# Patient Record
Sex: FEMALE | Race: BLACK OR AFRICAN AMERICAN | NOT HISPANIC OR LATINO | URBAN - METROPOLITAN AREA
[De-identification: names, ages, dates, MRNs, and addresses within clinical notes are randomized per-mention and may not be internally consistent; named-entity substitution may affect disease eponyms.]

---

## 2018-02-03 ENCOUNTER — EMERGENCY (EMERGENCY)
Facility: HOSPITAL | Age: 53
LOS: 0 days | Discharge: HOME | End: 2018-02-03
Attending: EMERGENCY MEDICINE
Payer: COMMERCIAL

## 2018-02-03 VITALS
DIASTOLIC BLOOD PRESSURE: 76 MMHG | HEIGHT: 63 IN | WEIGHT: 149.91 LBS | HEART RATE: 62 BPM | TEMPERATURE: 98 F | OXYGEN SATURATION: 100 % | RESPIRATION RATE: 18 BRPM | SYSTOLIC BLOOD PRESSURE: 133 MMHG
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
## 2018-02-03 DIAGNOSIS — S61.412A LACERATION WITHOUT FOREIGN BODY OF LEFT HAND, INITIAL ENCOUNTER: ICD-10-CM
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
## 2018-02-03 PROCEDURE — 99283 EMERGENCY DEPT VISIT LOW MDM: CPT | Mod: 25

## 2018-02-03 PROCEDURE — 12001 RPR S/N/AX/GEN/TRNK 2.5CM/<: CPT

## 2018-02-03 RX ORDER — TETANUS TOXOID, REDUCED DIPHTHERIA TOXOID AND ACELLULAR PERTUSSIS VACCINE, ADSORBED 5; 2.5; 8; 8; 2.5 [IU]/.5ML; [IU]/.5ML; UG/.5ML; UG/.5ML; UG/.5ML
0.5 SUSPENSION INTRAMUSCULAR ONCE
Qty: 0 | Refills: 0 | Status: COMPLETED | OUTPATIENT
Start: 2018-02-03 | End: 2018-02-03

## 2018-02-03 RX ADMIN — TETANUS TOXOID, REDUCED DIPHTHERIA TOXOID AND ACELLULAR PERTUSSIS VACCINE, ADSORBED 0.5 MILLILITER(S): 5; 2.5; 8; 8; 2.5 SUSPENSION INTRAMUSCULAR at 17:20

## 2018-02-03 NOTE — ED ADULT NURSE NOTE - OBJECTIVE STATEMENT
53 yo female co 2cm  lac to Left, palm, x today. cutting frozen vegetables. last tetanus vaccine unknown.

## 2018-02-14 ENCOUNTER — EMERGENCY (EMERGENCY)
Facility: HOSPITAL | Age: 53
LOS: 0 days | Discharge: HOME | End: 2018-02-14
Attending: EMERGENCY MEDICINE

## 2018-02-14 VITALS
OXYGEN SATURATION: 99 % | TEMPERATURE: 98 F | HEIGHT: 63 IN | HEART RATE: 73 BPM | WEIGHT: 149.91 LBS | SYSTOLIC BLOOD PRESSURE: 158 MMHG | RESPIRATION RATE: 18 BRPM | DIASTOLIC BLOOD PRESSURE: 83 MMHG

## 2018-02-14 DIAGNOSIS — Z48.02 ENCOUNTER FOR REMOVAL OF SUTURES: ICD-10-CM

## 2018-02-14 NOTE — ED PROVIDER NOTE - PROGRESS NOTE DETAILS
Pt. reports feeling better after meds and suture removal   pt. agrees to f/u with primary care outpt.  pt. understands to return to ED if symptoms worsen; will d/c

## 2018-02-14 NOTE — ED PROVIDER NOTE - OBJECTIVE STATEMENT
51 yo F presents for suture removal.  3 stitches were placed on Feb 3rd.  Pt. had laceration to L palm, 2 cm in length.  Pt. has no complaints, presents only for suture removal.  ROS: negative for fever, cough, headache, chest pain, shortness of breath, abd pain, nausea, vomiting, diarrhea, rash, paresthesia, and weakness.   PMH: negative; Meds: Denies; SH: Denies smoking/drinking/drug use

## 2018-02-14 NOTE — ED PROVIDER NOTE - PHYSICAL EXAMINATION
Vitals:   Gen: AAOx3, NAD, sitting comfortably in stretcher  Head: ncat, perrla, eomi b/l  Neck: supple, no lymphadenopathy, no midline deviation  Heart: rrr, no m/r/g  Lungs: CTA b/l, no rales/ronchi/wheezes  Abd: soft, nontender, non-distended, no rebound or guarding  Ext: no clubbing/cyanosis/edema, well-healed laceration to L palm 2 cm in length, 3 stiches in place, no signs of infection, no trailing erythema or discharge  Neuro: sensation and muscle strength intact b/l, steady gait

## 2020-07-20 NOTE — ED ADULT NURSE NOTE - NSSISCREENINGQ3_ED_A_ED
What Type Of Note Output Would You Prefer (Optional)?: Standard Output How Severe Is Your Skin Lesion?: moderate Has Your Skin Lesion Been Treated?: not been treated Is This A New Presentation, Or A Follow-Up?: Skin Lesions No

## 2022-07-12 ENCOUNTER — APPOINTMENT (OUTPATIENT)
Dept: GASTROENTEROLOGY | Facility: CLINIC | Age: 57
End: 2022-07-12

## 2022-07-12 ENCOUNTER — NON-APPOINTMENT (OUTPATIENT)
Age: 57
End: 2022-07-12

## 2022-07-12 ENCOUNTER — APPOINTMENT (OUTPATIENT)
Dept: THORACIC SURGERY | Facility: CLINIC | Age: 57
End: 2022-07-12

## 2022-07-12 VITALS
DIASTOLIC BLOOD PRESSURE: 67 MMHG | OXYGEN SATURATION: 98 % | SYSTOLIC BLOOD PRESSURE: 124 MMHG | BODY MASS INDEX: 25.69 KG/M2 | HEART RATE: 60 BPM | HEIGHT: 63 IN | WEIGHT: 145 LBS | RESPIRATION RATE: 16 BRPM

## 2022-07-12 DIAGNOSIS — Z86.39 PERSONAL HISTORY OF OTHER ENDOCRINE, NUTRITIONAL AND METABOLIC DISEASE: ICD-10-CM

## 2022-07-12 DIAGNOSIS — K44.9 DIAPHRAGMATIC HERNIA W/OUT OBSTRUCTION OR GANGRENE: ICD-10-CM

## 2022-07-12 DIAGNOSIS — Z01.818 ENCOUNTER FOR OTHER PREPROCEDURAL EXAMINATION: ICD-10-CM

## 2022-07-12 PROCEDURE — 99205 OFFICE O/P NEW HI 60 MIN: CPT

## 2022-07-12 PROCEDURE — 99441: CPT

## 2022-07-12 RX ORDER — OMEPRAZOLE 40 MG/1
40 CAPSULE, DELAYED RELEASE ORAL
Qty: 90 | Refills: 0 | Status: ACTIVE | COMMUNITY
Start: 2022-07-08

## 2022-07-12 NOTE — PHYSICAL EXAM
[Fully active, able to carry on all pre-disease performance without restriction] : Status 0 - Fully active, able to carry on all pre-disease performance without restriction [General Appearance - Alert] : alert [General Appearance - In No Acute Distress] : in no acute distress [Sclera] : the sclera and conjunctiva were normal [PERRL With Normal Accommodation] : pupils were equal in size, round, and reactive to light [Extraocular Movements] : extraocular movements were intact [Outer Ear] : the ears and nose were normal in appearance [Oropharynx] : the oropharynx was normal [Neck Appearance] : the appearance of the neck was normal [Neck Cervical Mass (___cm)] : no neck mass was observed [Jugular Venous Distention Increased] : there was no jugular-venous distention [Thyroid Diffuse Enlargement] : the thyroid was not enlarged [Thyroid Nodule] : there were no palpable thyroid nodules [Auscultation Breath Sounds / Voice Sounds] : lungs were clear to auscultation bilaterally [Heart Rate And Rhythm] : heart rate was normal and rhythm regular [Heart Sounds] : normal S1 and S2 [Heart Sounds Gallop] : no gallops [Murmurs] : no murmurs [Heart Sounds Pericardial Friction Rub] : no pericardial rub [Examination Of The Chest] : the chest was normal in appearance [Chest Visual Inspection Thoracic Asymmetry] : no chest asymmetry [Diminished Respiratory Excursion] : normal chest expansion [2+] : left 2+ [Breast Appearance] : normal in appearance [Breast Palpation Mass] : no palpable masses [Bowel Sounds] : normal bowel sounds [Abdomen Soft] : soft [Abdomen Tenderness] : non-tender [Abdomen Mass (___ Cm)] : no abdominal mass palpated [Cervical Lymph Nodes Enlarged Posterior Bilaterally] : posterior cervical [Cervical Lymph Nodes Enlarged Anterior Bilaterally] : anterior cervical [Supraclavicular Lymph Nodes Enlarged Bilaterally] : supraclavicular [No CVA Tenderness] : no ~M costovertebral angle tenderness [No Spinal Tenderness] : no spinal tenderness [Abnormal Walk] : normal gait [Nail Clubbing] : no clubbing  or cyanosis of the fingernails [Musculoskeletal - Swelling] : no joint swelling seen [Motor Tone] : muscle strength and tone were normal [Skin Color & Pigmentation] : normal skin color and pigmentation [Skin Turgor] : normal skin turgor [] : no rash [Deep Tendon Reflexes (DTR)] : deep tendon reflexes were 2+ and symmetric [Sensation] : the sensory exam was normal to light touch and pinprick [No Focal Deficits] : no focal deficits [Oriented To Time, Place, And Person] : oriented to person, place, and time [Impaired Insight] : insight and judgment were intact [Affect] : the affect was normal [FreeTextEntry1] : deferred

## 2022-07-12 NOTE — CONSULT LETTER
[Dear  ___] : Dear  [unfilled], [Consult Letter:] : I had the pleasure of evaluating your patient, [unfilled]. [( Thank you for referring [unfilled] for consultation for _____ )] : Thank you for referring [unfilled] for consultation for [unfilled] [Please see my note below.] : Please see my note below. [Consult Closing:] : Thank you very much for allowing me to participate in the care of this patient.  If you have any questions, please do not hesitate to contact me. [Sincerely,] : Sincerely, [FreeTextEntry2] : Manpreet Jeffrey MD (ref/GI) [FreeTextEntry3] : Saul Galvan MD \par Department of Cardiovascular and Thoracic Surgery \par  \par [facility] \par \par \par

## 2022-07-12 NOTE — HISTORY OF PRESENT ILLNESS
[FreeTextEntry1] : Ms. YEMI ALONSO, 57 year old female, never smoker, w/ hx of GERD, who presented with worsening acid reflux, epigastric pain/discomfort, food regurgitation since May this yr. \par \par EGD on 7/7/2017 showed chronic superficial gastritis w/o bleeding, polyp of stomach and duodenum. \par \par Pt presents today for CT Sx consultation, referred by Dr. Jeffrey. Pt admits to heartburn, epigastric pain/discomfort and food regurgitation.

## 2022-07-12 NOTE — ASSESSMENT
[FreeTextEntry1] : Ms. YEMI ALONSO, 57 year old female, never smoker, w/ hx of GERD, who presented with worsening acid reflux, epigastric pain/discomfort, food regurgitation since May this yr. \par \par EGD on 7/7/2017 showed chronic superficial gastritis w/o bleeding, polyp of stomach and duodenum. \par \par I have reviewed the patient's medical records and diagnostic images at time of this office consultation and have made the following recommendation:\par 1. Pt is very symptomatic, I recommended upper endoscopy, R.A. Hiatal hernia repair. Risks and benefits and alternatives explained to patient, all questions answered, patient agreed to proceed with surgery.\par 2. CT Chest w/o contrast, manometry by Dr. Shook, RUST to review all these studies prior to surgery\par 3. Medical clearance, PST, COVID testing\par \par \par I personally performed the services described in the documentation, reviewed the documentation recorded by the scribe in my presence and it accurately and completely records my words and actions. \par \par I, Liz Mtz, NP, am scribing for and the presence of AISLINN Schultz, the following sections HISTORY OF PRESENT ILLNESS, PAST MEDICAL/FAMILY/SOCIAL HISTORY; REVIEW OF SYSTEMS; VITAL SIGNS; PHYSICAL EXAM; DISPOSITION.\par

## 2022-07-13 PROBLEM — K44.9 DIAPHRAGMATIC HERNIA: Status: ACTIVE | Noted: 2022-07-12

## 2022-07-29 ENCOUNTER — OUTPATIENT (OUTPATIENT)
Dept: OUTPATIENT SERVICES | Facility: HOSPITAL | Age: 57
LOS: 1 days | Discharge: HOME | End: 2022-07-29
Payer: COMMERCIAL

## 2022-07-29 ENCOUNTER — APPOINTMENT (OUTPATIENT)
Dept: GASTROENTEROLOGY | Facility: HOSPITAL | Age: 57
End: 2022-07-29

## 2022-07-29 VITALS
SYSTOLIC BLOOD PRESSURE: 123 MMHG | HEIGHT: 63 IN | DIASTOLIC BLOOD PRESSURE: 68 MMHG | HEART RATE: 100 BPM | TEMPERATURE: 96 F | OXYGEN SATURATION: 100 % | WEIGHT: 145.06 LBS | RESPIRATION RATE: 16 BRPM

## 2022-07-29 DIAGNOSIS — K44.9 DIAPHRAGMATIC HERNIA WITHOUT OBSTRUCTION OR GANGRENE: ICD-10-CM

## 2022-07-29 PROCEDURE — 91037 ESOPH IMPED FUNCTION TEST: CPT | Mod: 26

## 2022-07-29 PROCEDURE — 91010 ESOPHAGUS MOTILITY STUDY: CPT | Mod: 26

## 2022-08-02 ENCOUNTER — NON-APPOINTMENT (OUTPATIENT)
Age: 57
End: 2022-08-02

## 2022-08-16 ENCOUNTER — APPOINTMENT (OUTPATIENT)
Dept: THORACIC SURGERY | Facility: HOSPITAL | Age: 57
End: 2022-08-16

## 2022-08-22 NOTE — ASSESSMENT
[FreeTextEntry1] : \par \par \par I have reviewed the patient's medical records and diagnostic images at time of this office consultation and have made the following recommendation:\par 1.\par \par \par \par I personally performed the services described in the documentation, reviewed the documentation recorded by the scribe in my presence and it accurately and completely records my words and actions. \par \par I, Liz Mtz NP, am scribing for and the presence of AISLINN Schultz, the following sections HISTORY OF PRESENT ILLNESS, PAST MEDICAL/FAMILY/SOCIAL HISTORY; REVIEW OF SYSTEMS; VITAL SIGNS; PHYSICAL EXAM; DISPOSITION.\par

## 2022-08-22 NOTE — CONSULT LETTER
[Dear  ___] : Dear  [unfilled], [Consult Letter:] : I had the pleasure of evaluating your patient, [unfilled]. [( Thank you for referring [unfilled] for consultation for _____ )] : Thank you for referring [unfilled] for consultation for [unfilled] [Please see my note below.] : Please see my note below. [Consult Closing:] : Thank you very much for allowing me to participate in the care of this patient.  If you have any questions, please do not hesitate to contact me. [Sincerely,] : Sincerely, [FreeTextEntry2] : Manpreet Jeffrey MD (ref/GI) [FreeTextEntry3] : Saul Galvan MD \par Department of Cardiovascular and Thoracic Surgery \par  \par [facility] \par \par \par

## 2022-08-22 NOTE — HISTORY OF PRESENT ILLNESS
[FreeTextEntry1] : Ms. YEMI ALONSO, 57 year old female, never smoker, w/ hx of GERD, who presented with worsening acid reflux, epigastric pain/discomfort, food regurgitation since May this yr. \par \par EGD on 7/7/2017 showed chronic superficial gastritis w/o bleeding, polyp of stomach and duodenum. \par \par CT Chest on 7/21/22:\par - 7 mm hypodense nodule Lt thyroid lobe\par - mild subsegmental atelectasis in the lingula segment of KAMILAH\par - no hiatal hernia\par \par Manometry on 7/29/22: normal. No manometric evidence of Hiatal hernia\par \par Pt presents today for follow up.\par

## 2022-08-23 ENCOUNTER — APPOINTMENT (OUTPATIENT)
Dept: THORACIC SURGERY | Facility: CLINIC | Age: 57
End: 2022-08-23

## 2022-09-29 ENCOUNTER — INPATIENT (INPATIENT)
Facility: HOSPITAL | Age: 57
LOS: 2 days | Discharge: HOME | End: 2022-10-02
Attending: SPECIALIST | Admitting: SPECIALIST

## 2022-09-29 VITALS
HEIGHT: 63 IN | OXYGEN SATURATION: 100 % | TEMPERATURE: 99 F | HEART RATE: 87 BPM | SYSTOLIC BLOOD PRESSURE: 123 MMHG | DIASTOLIC BLOOD PRESSURE: 57 MMHG | RESPIRATION RATE: 15 BRPM

## 2022-09-29 DIAGNOSIS — R10.9 UNSPECIFIED ABDOMINAL PAIN: ICD-10-CM

## 2022-09-29 LAB
ALBUMIN SERPL ELPH-MCNC: 3.4 G/DL — SIGNIFICANT CHANGE UP (ref 3.3–5)
ALP SERPL-CCNC: 271 U/L — HIGH (ref 40–120)
ALT FLD-CCNC: 114 U/L — HIGH (ref 4–33)
ANION GAP SERPL CALC-SCNC: 9 MMOL/L — SIGNIFICANT CHANGE UP (ref 7–14)
APTT BLD: 30.4 SEC — SIGNIFICANT CHANGE UP (ref 27–36.3)
AST SERPL-CCNC: 80 U/L — HIGH (ref 4–32)
BASOPHILS # BLD AUTO: 0.02 K/UL — SIGNIFICANT CHANGE UP (ref 0–0.2)
BASOPHILS NFR BLD AUTO: 0.1 % — SIGNIFICANT CHANGE UP (ref 0–2)
BILIRUB SERPL-MCNC: 0.7 MG/DL — SIGNIFICANT CHANGE UP (ref 0.2–1.2)
BLD GP AB SCN SERPL QL: NEGATIVE — SIGNIFICANT CHANGE UP
BUN SERPL-MCNC: 5 MG/DL — LOW (ref 7–23)
CALCIUM SERPL-MCNC: 8.7 MG/DL — SIGNIFICANT CHANGE UP (ref 8.4–10.5)
CHLORIDE SERPL-SCNC: 96 MMOL/L — LOW (ref 98–107)
CO2 SERPL-SCNC: 27 MMOL/L — SIGNIFICANT CHANGE UP (ref 22–31)
CREAT SERPL-MCNC: 0.82 MG/DL — SIGNIFICANT CHANGE UP (ref 0.5–1.3)
EGFR: 84 ML/MIN/1.73M2 — SIGNIFICANT CHANGE UP
EOSINOPHIL # BLD AUTO: 0.02 K/UL — SIGNIFICANT CHANGE UP (ref 0–0.5)
EOSINOPHIL NFR BLD AUTO: 0.1 % — SIGNIFICANT CHANGE UP (ref 0–6)
FLUAV AG NPH QL: SIGNIFICANT CHANGE UP
FLUBV AG NPH QL: SIGNIFICANT CHANGE UP
GLUCOSE SERPL-MCNC: 98 MG/DL — SIGNIFICANT CHANGE UP (ref 70–99)
HCT VFR BLD CALC: 36.5 % — SIGNIFICANT CHANGE UP (ref 34.5–45)
HGB BLD-MCNC: 12 G/DL — SIGNIFICANT CHANGE UP (ref 11.5–15.5)
IANC: 10.56 K/UL — HIGH (ref 1.8–7.4)
IMM GRANULOCYTES NFR BLD AUTO: 1.1 % — HIGH (ref 0–0.9)
INR BLD: 1.16 RATIO — SIGNIFICANT CHANGE UP (ref 0.88–1.16)
LIDOCAIN IGE QN: 72 U/L — HIGH (ref 7–60)
LYMPHOCYTES # BLD AUTO: 14.1 % — SIGNIFICANT CHANGE UP (ref 13–44)
LYMPHOCYTES # BLD AUTO: 2 K/UL — SIGNIFICANT CHANGE UP (ref 1–3.3)
MCHC RBC-ENTMCNC: 28.6 PG — SIGNIFICANT CHANGE UP (ref 27–34)
MCHC RBC-ENTMCNC: 32.9 GM/DL — SIGNIFICANT CHANGE UP (ref 32–36)
MCV RBC AUTO: 86.9 FL — SIGNIFICANT CHANGE UP (ref 80–100)
MONOCYTES # BLD AUTO: 1.47 K/UL — HIGH (ref 0–0.9)
MONOCYTES NFR BLD AUTO: 10.3 % — SIGNIFICANT CHANGE UP (ref 2–14)
NEUTROPHILS # BLD AUTO: 10.56 K/UL — HIGH (ref 1.8–7.4)
NEUTROPHILS NFR BLD AUTO: 74.3 % — SIGNIFICANT CHANGE UP (ref 43–77)
NRBC # BLD: 0 /100 WBCS — SIGNIFICANT CHANGE UP (ref 0–0)
NRBC # FLD: 0 K/UL — SIGNIFICANT CHANGE UP (ref 0–0)
PLATELET # BLD AUTO: 305 K/UL — SIGNIFICANT CHANGE UP (ref 150–400)
POTASSIUM SERPL-MCNC: 3 MMOL/L — LOW (ref 3.5–5.3)
POTASSIUM SERPL-SCNC: 3 MMOL/L — LOW (ref 3.5–5.3)
PROT SERPL-MCNC: 7.5 G/DL — SIGNIFICANT CHANGE UP (ref 6–8.3)
PROTHROM AB SERPL-ACNC: 13.5 SEC — HIGH (ref 10.5–13.4)
RBC # BLD: 4.2 M/UL — SIGNIFICANT CHANGE UP (ref 3.8–5.2)
RBC # FLD: 13.2 % — SIGNIFICANT CHANGE UP (ref 10.3–14.5)
RH IG SCN BLD-IMP: POSITIVE — SIGNIFICANT CHANGE UP
RSV RNA NPH QL NAA+NON-PROBE: SIGNIFICANT CHANGE UP
SARS-COV-2 RNA SPEC QL NAA+PROBE: SIGNIFICANT CHANGE UP
SODIUM SERPL-SCNC: 132 MMOL/L — LOW (ref 135–145)
WBC # BLD: 14.23 K/UL — HIGH (ref 3.8–10.5)
WBC # FLD AUTO: 14.23 K/UL — HIGH (ref 3.8–10.5)

## 2022-09-29 PROCEDURE — 76705 ECHO EXAM OF ABDOMEN: CPT | Mod: 26

## 2022-09-29 PROCEDURE — 99285 EMERGENCY DEPT VISIT HI MDM: CPT

## 2022-09-29 RX ORDER — METRONIDAZOLE 500 MG
500 TABLET ORAL EVERY 8 HOURS
Refills: 0 | Status: DISCONTINUED | OUTPATIENT
Start: 2022-09-30 | End: 2022-10-02

## 2022-09-29 RX ORDER — CIPROFLOXACIN LACTATE 400MG/40ML
VIAL (ML) INTRAVENOUS
Refills: 0 | Status: DISCONTINUED | OUTPATIENT
Start: 2022-09-29 | End: 2022-10-02

## 2022-09-29 RX ORDER — ENOXAPARIN SODIUM 100 MG/ML
40 INJECTION SUBCUTANEOUS EVERY 24 HOURS
Refills: 0 | Status: DISCONTINUED | OUTPATIENT
Start: 2022-09-29 | End: 2022-10-02

## 2022-09-29 RX ORDER — DEXTROSE MONOHYDRATE, SODIUM CHLORIDE, AND POTASSIUM CHLORIDE 50; .745; 4.5 G/1000ML; G/1000ML; G/1000ML
1000 INJECTION, SOLUTION INTRAVENOUS
Refills: 0 | Status: DISCONTINUED | OUTPATIENT
Start: 2022-09-29 | End: 2022-10-01

## 2022-09-29 RX ORDER — IBUPROFEN 200 MG
400 TABLET ORAL EVERY 6 HOURS
Refills: 0 | Status: DISCONTINUED | OUTPATIENT
Start: 2022-09-29 | End: 2022-10-02

## 2022-09-29 RX ORDER — FAMOTIDINE 10 MG/ML
20 INJECTION INTRAVENOUS ONCE
Refills: 0 | Status: COMPLETED | OUTPATIENT
Start: 2022-09-29 | End: 2022-09-29

## 2022-09-29 RX ORDER — METRONIDAZOLE 500 MG
TABLET ORAL
Refills: 0 | Status: DISCONTINUED | OUTPATIENT
Start: 2022-10-02 | End: 2022-10-02

## 2022-09-29 RX ORDER — ACETAMINOPHEN 500 MG
650 TABLET ORAL EVERY 6 HOURS
Refills: 0 | Status: DISCONTINUED | OUTPATIENT
Start: 2022-09-29 | End: 2022-10-02

## 2022-09-29 RX ORDER — CIPROFLOXACIN LACTATE 400MG/40ML
400 VIAL (ML) INTRAVENOUS ONCE
Refills: 0 | Status: COMPLETED | OUTPATIENT
Start: 2022-09-29 | End: 2022-09-29

## 2022-09-29 RX ORDER — OXYCODONE HYDROCHLORIDE 5 MG/1
5 TABLET ORAL EVERY 6 HOURS
Refills: 0 | Status: DISCONTINUED | OUTPATIENT
Start: 2022-09-29 | End: 2022-10-02

## 2022-09-29 RX ORDER — SODIUM CHLORIDE 9 MG/ML
1000 INJECTION INTRAMUSCULAR; INTRAVENOUS; SUBCUTANEOUS ONCE
Refills: 0 | Status: COMPLETED | OUTPATIENT
Start: 2022-09-29 | End: 2022-09-29

## 2022-09-29 RX ORDER — METRONIDAZOLE 500 MG
500 TABLET ORAL ONCE
Refills: 0 | Status: COMPLETED | OUTPATIENT
Start: 2022-09-29 | End: 2022-09-29

## 2022-09-29 RX ORDER — CIPROFLOXACIN LACTATE 400MG/40ML
400 VIAL (ML) INTRAVENOUS EVERY 12 HOURS
Refills: 0 | Status: DISCONTINUED | OUTPATIENT
Start: 2022-09-30 | End: 2022-10-02

## 2022-09-29 RX ORDER — PANTOPRAZOLE SODIUM 20 MG/1
40 TABLET, DELAYED RELEASE ORAL EVERY 24 HOURS
Refills: 0 | Status: DISCONTINUED | OUTPATIENT
Start: 2022-09-29 | End: 2022-10-02

## 2022-09-29 RX ORDER — OXYCODONE HYDROCHLORIDE 5 MG/1
2.5 TABLET ORAL EVERY 6 HOURS
Refills: 0 | Status: DISCONTINUED | OUTPATIENT
Start: 2022-09-29 | End: 2022-10-02

## 2022-09-29 RX ADMIN — FAMOTIDINE 20 MILLIGRAM(S): 10 INJECTION INTRAVENOUS at 20:11

## 2022-09-29 RX ADMIN — Medication 200 MILLIGRAM(S): at 22:09

## 2022-09-29 RX ADMIN — SODIUM CHLORIDE 1000 MILLILITER(S): 9 INJECTION INTRAMUSCULAR; INTRAVENOUS; SUBCUTANEOUS at 20:11

## 2022-09-29 NOTE — ED PROVIDER NOTE - OBJECTIVE STATEMENT
56 y/o F h/o cholelithiasis sent to ED by Dr Martínez with concern for gallbladder infection. Pt was seen in office by Dr. Martínez, had blood work drawn, results were concerning for gallbladder infection per patient. Pt was advised to come to ED. Pt reports epigastric pain x 1 week a/w n/v. Pt reports last episode of emesis on Friday, has not been eating much since. States when she goes to eat she gets nauseous. Pt denies fever, chills, chest pain, sob, cough, diarrhea, dysuria, headache, dizziness.

## 2022-09-29 NOTE — H&P ADULT - NSHPPHYSICALEXAM_GEN_ALL_CORE
Vital Signs Last 24 Hrs  T(C): 37.1 (29 Sep 2022 17:54), Max: 37.1 (29 Sep 2022 17:54)  T(F): 98.8 (29 Sep 2022 17:54), Max: 98.8 (29 Sep 2022 17:54)  HR: 87 (29 Sep 2022 17:54) (87 - 87)  BP: 123/57 (29 Sep 2022 17:54) (123/57 - 123/57)  BP(mean): --  RR: 15 (29 Sep 2022 17:54) (15 - 15)  SpO2: 100% (29 Sep 2022 17:54) (100% - 100%)    Parameters below as of 29 Sep 2022 17:54  Patient On (Oxygen Delivery Method): room air    General: well developed, well nourished, NAD  Neuro: alert and oriented, no focal deficits, moves all extremities spontaneously  HEENT: NCAT, EOMI, anicteric, mucosa moist  Respiratory: airway patent, respirations unlabored  CVS: regular rate  Abd: Soft, tender in epigastrium and RUQ, nondistended  Extremities: no edema, sensation and movement grossly intact  Skin: warm, dry, appropriate color

## 2022-09-29 NOTE — ED PROVIDER NOTE - ATTENDING APP SHARED VISIT CONTRIBUTION OF CARE
Satish: I have seen and examined the patient face to face, have reviewed and addended the HPI, PE and a/p as necessary.    58 yo F with cholelithiasis a/w elevated LFTs with concern for acute cholecystitis.  Pt seen by Dr. Martínez yesterday with bloodwork drawn yesterday called by Dr. Martínez today to come to ED for possible gallbladder infection.  Pt reports persistent abdominal pain x 1 week with associated nausea and vomiting and decreased PO intake.  Denies fever, chills, chest pain, sob, cough, diarrhea, dysuria, headache, dizziness.    GEN - NAD; well appearing; A+O x3; non-toxic appearing  CARD -s1s2, RRR, no M,G,R;   PULM - CTA b/l, symmetric breath sounds;   ABD -  +BS, TTP in RUQ +cervantes's, soft, no guarding, no rebound, no masses;   BACK - no CVA tenderness, Normal  spine;   EXT - symmetric pulses, 2+ dp, capillary refill < 2 seconds, no cyanosis, no edema;   NEURO - no focal neuro deficits, no slurred speech    Concern for acute kameron vs choledocholithiasis, vs symptomatic cholelithiasis.  will eval gb with us, check cbc cmp, lipase, and surgery eval.  Surgery present at bedside during my evaluation.

## 2022-09-29 NOTE — ED ADULT NURSE NOTE - CHIEF COMPLAINT QUOTE
Pt c/o abdominal pain  X 1 week. Pt had call back from  today, CT scan showing gallstones. Phx: GERD

## 2022-09-29 NOTE — H&P ADULT - NSHPROSALLOTHERNEGRD_GEN_ALL_CORE
Detail Level: Detailed
Add 85663 Cpt? (Important Note: In 2017 The Use Of 52706 Is Being Tracked By Cms To Determine Future Global Period Reimbursement For Global Periods): no
All other review of systems negative, except as noted in HPI

## 2022-09-29 NOTE — H&P ADULT - ASSESSMENT
57F pmh biliary colic with cholelithiasis, hiatal hernia, GERD presenting with abdominal pain since 9/23.    Plan:  - Admit tot Dr. Martínez  - NPO, IVF, Cipro/Flagyl  - FU US  - MRCP, GI consult pending MRCP  - Lovenox  - Protonix    Discussed with Dr. Mauricio GALINDO Team Surgery  [id number]

## 2022-09-29 NOTE — H&P ADULT - HISTORY OF PRESENT ILLNESS
57F pmh biliary colic with cholelithiasis, hiatal hernia, GERD presenting with abdominal pain since 9/23.    Reports "hot" abdominal pain up to breast since 9/22, 10/10 at first now 4/10, constant, worsened with eating. Had multiple emesis on 9/22-9/24, yellow in color then with some black fragments per family member. Recent history of darker urine. No fevers, night sweats, some chills. No chest pain, shortness of breath. Had one bowel movement this week, constipated, normal in color.

## 2022-09-29 NOTE — ED ADULT NURSE NOTE - NSIMPLEMENTINTERV_GEN_ALL_ED
Implemented All Universal Safety Interventions:  [place] to call system. Call bell, personal items and telephone within reach. Instruct patient to call for assistance. Room bathroom lighting operational. Non-slip footwear when patient is off stretcher. Physically safe environment: no spills, clutter or unnecessary equipment. Stretcher in lowest position, wheels locked, appropriate side rails in place.

## 2022-09-29 NOTE — ED ADULT NURSE NOTE - OBJECTIVE STATEMENT
Received pt to intake 10a, A+Ox4, ambulatory. C/O lower abdominal pain, seen outpatient diagnosed with gallbladder stone. endorsing nausea. ABD is soft, tender, non distended. Respirations even and unlabored, normal work of breathing, no accessory muscle use, speaking in full clear uninterrupted sentences. Pt denies any chest pain, SOB, headache, dizziness, fever, chills.  20G to RAC, Labs sent, Medicated as per MD, will continue to monitor.

## 2022-09-29 NOTE — H&P ADULT - NSICDXPASTMEDICALHX_GEN_ALL_CORE_FT
PAST MEDICAL HISTORY:  Hiatal hernia     Hiatal hernia with GERD     No pertinent past medical history

## 2022-09-29 NOTE — ED PROVIDER NOTE - CLINICAL SUMMARY MEDICAL DECISION MAKING FREE TEXT BOX
58 y/o F h/o cholelithiasis sent to ED by Dr Martínez with concern for gallbladder infection.  plan  - labs  - t&s, coags  - ruq us  - ivf   - pain control  - surgery consult

## 2022-09-29 NOTE — H&P ADULT - NSHPLABSRESULTS_GEN_ALL_CORE
----------  LABORATORY DATA:  ----------                        12.0   14.23 )-----------( 305      ( 29 Sep 2022 20:10 )             36.5               09-29    x   |  x   |  5<L>  ----------------------------<  98  x    |  27  |  0.82    Ca    8.7      29 Sep 2022 20:10    TPro  7.5  /  Alb  x   /  TBili  x   /  DBili  x   /  AST  x   /  ALT  x   /  AlkPhos  x   09-29    LIVER FUNCTIONS - ( 29 Sep 2022 20:10 )  Alb: x     / Pro: 7.5 g/dL / ALK PHOS: x     / ALT: x     / AST: x     / GGT: x

## 2022-09-29 NOTE — ED PROVIDER NOTE - NS ED ATTENDING STATEMENT MOD
This was a shared visit with the CARMELA. I reviewed and verified the documentation and independently performed the documented:

## 2022-09-30 LAB
ALBUMIN SERPL ELPH-MCNC: 3.4 G/DL — SIGNIFICANT CHANGE UP (ref 3.3–5)
ALP SERPL-CCNC: 256 U/L — HIGH (ref 40–120)
ALT FLD-CCNC: 101 U/L — HIGH (ref 4–33)
ANION GAP SERPL CALC-SCNC: 11 MMOL/L — SIGNIFICANT CHANGE UP (ref 7–14)
AST SERPL-CCNC: 56 U/L — HIGH (ref 4–32)
BILIRUB SERPL-MCNC: 0.6 MG/DL — SIGNIFICANT CHANGE UP (ref 0.2–1.2)
BLD GP AB SCN SERPL QL: NEGATIVE — SIGNIFICANT CHANGE UP
BUN SERPL-MCNC: 6 MG/DL — LOW (ref 7–23)
CALCIUM SERPL-MCNC: 8.8 MG/DL — SIGNIFICANT CHANGE UP (ref 8.4–10.5)
CHLORIDE SERPL-SCNC: 101 MMOL/L — SIGNIFICANT CHANGE UP (ref 98–107)
CO2 SERPL-SCNC: 27 MMOL/L — SIGNIFICANT CHANGE UP (ref 22–31)
CREAT SERPL-MCNC: 0.89 MG/DL — SIGNIFICANT CHANGE UP (ref 0.5–1.3)
EGFR: 76 ML/MIN/1.73M2 — SIGNIFICANT CHANGE UP
GLUCOSE SERPL-MCNC: 117 MG/DL — HIGH (ref 70–99)
HCT VFR BLD CALC: 36.8 % — SIGNIFICANT CHANGE UP (ref 34.5–45)
HGB BLD-MCNC: 12 G/DL — SIGNIFICANT CHANGE UP (ref 11.5–15.5)
MAGNESIUM SERPL-MCNC: 2.1 MG/DL — SIGNIFICANT CHANGE UP (ref 1.6–2.6)
MAGNESIUM SERPL-MCNC: 2.2 MG/DL — SIGNIFICANT CHANGE UP (ref 1.6–2.6)
MCHC RBC-ENTMCNC: 28.5 PG — SIGNIFICANT CHANGE UP (ref 27–34)
MCHC RBC-ENTMCNC: 32.6 GM/DL — SIGNIFICANT CHANGE UP (ref 32–36)
MCV RBC AUTO: 87.4 FL — SIGNIFICANT CHANGE UP (ref 80–100)
NRBC # BLD: 0 /100 WBCS — SIGNIFICANT CHANGE UP (ref 0–0)
NRBC # FLD: 0 K/UL — SIGNIFICANT CHANGE UP (ref 0–0)
PHOSPHATE SERPL-MCNC: 3.3 MG/DL — SIGNIFICANT CHANGE UP (ref 2.5–4.5)
PHOSPHATE SERPL-MCNC: 3.4 MG/DL — SIGNIFICANT CHANGE UP (ref 2.5–4.5)
PLATELET # BLD AUTO: 300 K/UL — SIGNIFICANT CHANGE UP (ref 150–400)
POTASSIUM SERPL-MCNC: 3.5 MMOL/L — SIGNIFICANT CHANGE UP (ref 3.5–5.3)
POTASSIUM SERPL-SCNC: 3.5 MMOL/L — SIGNIFICANT CHANGE UP (ref 3.5–5.3)
PROT SERPL-MCNC: 6.9 G/DL — SIGNIFICANT CHANGE UP (ref 6–8.3)
RBC # BLD: 4.21 M/UL — SIGNIFICANT CHANGE UP (ref 3.8–5.2)
RBC # FLD: 13.3 % — SIGNIFICANT CHANGE UP (ref 10.3–14.5)
RH IG SCN BLD-IMP: POSITIVE — SIGNIFICANT CHANGE UP
SODIUM SERPL-SCNC: 139 MMOL/L — SIGNIFICANT CHANGE UP (ref 135–145)
WBC # BLD: 13.26 K/UL — HIGH (ref 3.8–10.5)
WBC # FLD AUTO: 13.26 K/UL — HIGH (ref 3.8–10.5)

## 2022-09-30 PROCEDURE — 47490 INCISION OF GALLBLADDER: CPT | Mod: GC

## 2022-09-30 RX ORDER — ACETAMINOPHEN 500 MG
1000 TABLET ORAL ONCE
Refills: 0 | Status: COMPLETED | OUTPATIENT
Start: 2022-09-30 | End: 2022-09-30

## 2022-09-30 RX ORDER — INFLUENZA VIRUS VACCINE 15; 15; 15; 15 UG/.5ML; UG/.5ML; UG/.5ML; UG/.5ML
0.5 SUSPENSION INTRAMUSCULAR ONCE
Refills: 0 | Status: DISCONTINUED | OUTPATIENT
Start: 2022-09-30 | End: 2022-10-02

## 2022-09-30 RX ORDER — SODIUM CHLORIDE 9 MG/ML
500 INJECTION INTRAMUSCULAR; INTRAVENOUS; SUBCUTANEOUS ONCE
Refills: 0 | Status: COMPLETED | OUTPATIENT
Start: 2022-09-30 | End: 2022-09-30

## 2022-09-30 RX ORDER — POTASSIUM CHLORIDE 20 MEQ
40 PACKET (EA) ORAL EVERY 4 HOURS
Refills: 0 | Status: COMPLETED | OUTPATIENT
Start: 2022-09-30 | End: 2022-09-30

## 2022-09-30 RX ADMIN — ENOXAPARIN SODIUM 40 MILLIGRAM(S): 100 INJECTION SUBCUTANEOUS at 01:11

## 2022-09-30 RX ADMIN — Medication 100 MILLIGRAM(S): at 13:56

## 2022-09-30 RX ADMIN — Medication 1000 MILLIGRAM(S): at 18:40

## 2022-09-30 RX ADMIN — Medication 650 MILLIGRAM(S): at 01:10

## 2022-09-30 RX ADMIN — DEXTROSE MONOHYDRATE, SODIUM CHLORIDE, AND POTASSIUM CHLORIDE 125 MILLILITER(S): 50; .745; 4.5 INJECTION, SOLUTION INTRAVENOUS at 01:11

## 2022-09-30 RX ADMIN — Medication 100 MILLIGRAM(S): at 22:45

## 2022-09-30 RX ADMIN — OXYCODONE HYDROCHLORIDE 5 MILLIGRAM(S): 5 TABLET ORAL at 19:51

## 2022-09-30 RX ADMIN — Medication 200 MILLIGRAM(S): at 05:48

## 2022-09-30 RX ADMIN — OXYCODONE HYDROCHLORIDE 5 MILLIGRAM(S): 5 TABLET ORAL at 20:21

## 2022-09-30 RX ADMIN — Medication 400 MILLIGRAM(S): at 18:10

## 2022-09-30 RX ADMIN — SODIUM CHLORIDE 1000 MILLILITER(S): 9 INJECTION INTRAMUSCULAR; INTRAVENOUS; SUBCUTANEOUS at 18:05

## 2022-09-30 RX ADMIN — Medication 650 MILLIGRAM(S): at 02:47

## 2022-09-30 RX ADMIN — Medication 40 MILLIEQUIVALENT(S): at 05:47

## 2022-09-30 RX ADMIN — Medication 100 MILLIGRAM(S): at 05:49

## 2022-09-30 RX ADMIN — Medication 40 MILLIEQUIVALENT(S): at 01:11

## 2022-09-30 RX ADMIN — PANTOPRAZOLE SODIUM 40 MILLIGRAM(S): 20 TABLET, DELAYED RELEASE ORAL at 01:11

## 2022-09-30 RX ADMIN — Medication 400 MILLIGRAM(S): at 02:00

## 2022-09-30 RX ADMIN — DEXTROSE MONOHYDRATE, SODIUM CHLORIDE, AND POTASSIUM CHLORIDE 125 MILLILITER(S): 50; .745; 4.5 INJECTION, SOLUTION INTRAVENOUS at 22:45

## 2022-09-30 RX ADMIN — Medication 400 MILLIGRAM(S): at 01:15

## 2022-09-30 NOTE — CHART NOTE - NSCHARTNOTEFT_GEN_A_CORE
Preop Note    Patient is a 57y old  Female who presents with a chief complaint of Abdominal pain (30 Sep 2022 14:59)    Diagnosis: possibly gangrenous cholecystitis  Procedure: IR guided perc kameron  Surgeon: IR                          12.0   13.26 )-----------( 300      ( 30 Sep 2022 07:04 )             36.8     09-30    139  |  101  |  6<L>  ----------------------------<  117<H>  3.5   |  27  |  0.89    Ca    8.8      30 Sep 2022 07:04  Phos  3.3     09-30  Mg     2.10     09-30    TPro  6.9  /  Alb  3.4  /  TBili  0.6  /  DBili  x   /  AST  56<H>  /  ALT  101<H>  /  AlkPhos  256<H>  09-30    PT/INR - ( 29 Sep 2022 20:10 )   PT: 13.5 sec;   INR: 1.16 ratio         PTT - ( 29 Sep 2022 20:10 )  PTT:30.4 sec      [X] Type & Screen  [X] CBC  [X] BMP  [X] PT/PTT/INR  [X] NPO/IVF

## 2022-09-30 NOTE — CONSULT NOTE ADULT - SUBJECTIVE AND OBJECTIVE BOX
HISTORY OF PRESENT ILLNESS: HPI:  57F pmh biliary colic with cholelithiasis, hiatal hernia, GERD presenting with abdominal pain since 9/23.    Reports "hot" abdominal pain up to breast since 9/22, 10/10 at first now 4/10, constant, worsened with eating. Had multiple emesis on 9/22-9/24, yellow in color then with some black fragments per family member. Recent history of darker urine. No fevers, night sweats, some chills. No chest pain, shortness of breath. Had one bowel movement this week, constipated, normal in color. (29 Sep 2022 21:24)    Pt denies abdominal pain at present, denies chest pain, SOB or Palps.    PAST MEDICAL & SURGICAL HISTORY:  No pertinent past medical history      Hiatal hernia      Hiatal hernia with GERD    No significant past surgical history      MEDICATIONS  (STANDING):  acetaminophen     Tablet .. 650 milliGRAM(s) Oral every 6 hours  ciprofloxacin   IVPB 400 milliGRAM(s) IV Intermittent every 12 hours  ciprofloxacin   IVPB      dextrose 5% + sodium chloride 0.45% with potassium chloride 20 mEq/L 1000 milliLiter(s) (125 mL/Hr) IV Continuous <Continuous>  enoxaparin Injectable 40 milliGRAM(s) SubCutaneous every 24 hours  ibuprofen  Tablet. 400 milliGRAM(s) Oral every 6 hours  influenza   Vaccine 0.5 milliLiter(s) IntraMuscular once  metroNIDAZOLE  IVPB      metroNIDAZOLE  IVPB 500 milliGRAM(s) IV Intermittent once  metroNIDAZOLE  IVPB 500 milliGRAM(s) IV Intermittent every 8 hours  pantoprazole  Injectable 40 milliGRAM(s) IV Push every 24 hours      Allergies  No Known Allergies      FAMILY HISTORY:  Noncontributory for premature coronary disease or sudden cardiac death    SOCIAL HISTORY:    [x ] Non-smoker  [ ] Smoker  [ ] Alcohol    FLU VACCINE THIS YEAR STARTS IN AUGUST:  [ ] Yes    [ ] No    IF OVER 65 HAVE YOU EVER HAD A PNA VACCINE:  [ ] Yes    [ ] No       [ ] N/A      REVIEW OF SYSTEMS:  [ ]chest pain  [  ]shortness of breath  [  ]palpitations  [  ]syncope  [ ]near syncope [ ]upper extremity weakness   [ ] lower extremity weakness  [  ]diplopia  [  ]altered mental status   [  ]fevers  [ ]chills [ ]nausea  [ ]vomiting  [  ]dysphagia    [x ]abdominal pain  [ ]melena  [ ]BRBPR    [  ]epistaxis  [  ]rash    [ ]lower extremity edema        [x ] All others negative	  [ ] Unable to obtain      LABS:	 	                        12.0   13.26 )-----------( 300      ( 30 Sep 2022 07:04 )             36.8     Hb Trend: 12.0<--, 12.0<--    09-30    139  |  101  |  6<L>  ----------------------------<  117<H>  3.5   |  27  |  0.89    Ca    8.8      30 Sep 2022 07:04  Phos  3.3     09-30  Mg     2.10     09-30    TPro  6.9  /  Alb  3.4  /  TBili  0.6  /  DBili  x   /  AST  56<H>  /  ALT  101<H>  /  AlkPhos  256<H>  09-30    Creatinine Trend: 0.89<--, 0.82<--    Coags:  PT/INR - ( 29 Sep 2022 20:10 )   PT: 13.5 sec;   INR: 1.16 ratio         PTT - ( 29 Sep 2022 20:10 )  PTT:30.4 sec      PHYSICAL EXAM:  T(C): 36.7 (09-30-22 @ 09:19), Max: 37.9 (09-29-22 @ 23:00)  HR: 61 (09-30-22 @ 09:19) (61 - 89)  BP: 106/66 (09-30-22 @ 09:19) (106/66 - 133/75)  RR: 17 (09-30-22 @ 09:19) (15 - 18)  SpO2: 99% (09-30-22 @ 09:19) (99% - 100%)  Wt(kg): --     I&O's Summary    30 Sep 2022 07:01  -  30 Sep 2022 15:00  --------------------------------------------------------  IN: 500 mL / OUT: 0 mL / NET: 500 mL        Gen: Appears well in NAD  HEENT:  (-)icterus (-)pallor  CV: N S1 S2 1/6 SINCERE (+)2 Pulses B/l  Resp:  Clear to ausculatation B/L, normal effort  GI: (+) BS Soft, NT, ND  Lymph:  (-)Edema, (-)obvious lymphadenopathy  Skin: Warm to touch, Normal turgor  Psych: Appropriate mood and affect    ECG: n/a 	      ASSESSMENT/PLAN: 	57F pmh biliary colic with cholelithiasis, hiatal hernia, GERD presenting with abdominal pain since 9/23.    --asked to follow patient by Dr Kothari, PMD  --F/u surgery work up  --if surgical procedure planned, please check 12 lead EKG    Thank you for allowing us to participate in the care of our mutual patient.  Please do not hesitate to call with any questions.     Meli GRIMM  [phone]

## 2022-09-30 NOTE — PROGRESS NOTE ADULT - ASSESSMENT
Acute on chronic cholecystitis in a patent with a long history of cholelithiasis and choledocholithiasis.

## 2022-09-30 NOTE — CONSULT NOTE ADULT - NS ATTEND AMEND GEN_ALL_CORE FT
Patient seen and examined. Agree with plan as detailed in PA/NP Note.     Not in heart failure on exam, denies any chest pain, no stenotic valvular lesions auscultated. Please check EKG if plan to go to OR.    Ashwini Canales MD  Pager: [phone]

## 2022-09-30 NOTE — CHART NOTE - NSCHARTNOTEFT_GEN_A_CORE
Post Procedure Note  Patient: YEMI ALONSO 57y (1965) Female   MRN: [id number]  Location: [address]  Visit: 09-29-22 Inpatient  Date: 09-30-22 @ 21:35    Procedure: S/P percutaneous cholecystostomy     Subjective: Patient feeling well. Some pain around new drain sight. Denies fever, chills, nausea, vomiting, headache, dizziness.      Objective:  Vitals: T(F): 97.8 (09-30-22 @ 16:00), Max: 100.3 (09-29-22 @ 23:00)  HR: 63 (09-30-22 @ 16:00)  BP: 108/64 (09-30-22 @ 16:00) (106/66 - 133/75)  RR: 18 (09-30-22 @ 16:00)  SpO2: 98% (09-30-22 @ 16:00)  Vent Settings:     In:   09-30-22 @ 07:01  -  09-30-22 @ 21:35  --------------------------------------------------------  IN: 500 mL      IV Fluids: dextrose 5% + sodium chloride 0.45% with potassium chloride 20 mEq/L 1000 milliLiter(s) (125 mL/Hr) IV Continuous <Continuous>      Out:   09-30-22 @ 07:01  -  09-30-22 @ 21:35  --------------------------------------------------------  OUT: 0 mL      EBL:     Voided Urine:   09-30-22 @ 07:01  -  09-30-22 @ 21:35  --------------------------------------------------------  OUT: 0 mL      Lozano Catheter: yes no   Drains:   SHARONDA:    ,   Chest Tube:      NG Tube:         Physical Examination:  General: NAD, resting comfortably in bed  HEENT: Normocephalic atraumatic  Respiratory: Nonlabored respirations, normal CW expansion.  Cardio: regular rate and rhythm.  Abdomen: softly distended, appropriately tender,   Vascular: extremities are warm and well perfused.     Imaging:  No post-op imaging studies    Assessment:  57yFemale patient S/P percutaneous cholecystostomy 2/2 gangrenous cholecystitis. Recovering well post procedure.     Plan:  - IV Abx: cipro/flagyl  - Pain control PRN  - Diet: NPO  - Activity: as tolerated  - DVT ppx: lovenox    Date/Time: 09-30-22 @ 21:35

## 2022-09-30 NOTE — PROGRESS NOTE ADULT - ASSESSMENT
57F pmh biliary colic with cholelithiasis, hiatal hernia, GERD presenting with abdominal pain since 9/23.    Plan:  - NPO, IVF, Cipro/Flagyl  - FU US  - MRCP, GI consult pending MRCP  - Lovenox  - Protonix Assessment: 57F PMH biliary colic with cholelithiasis, hiatal hernia, GERD presenting with abdominal pain since 9/23.    Plan:  - NPO / IVF  - Cipro/Flagyl  - FU US  - MRCP, GI consult pending MRCP  - Lovenox  - Protonix        A Team Surgery   [id number]

## 2022-09-30 NOTE — PROGRESS NOTE ADULT - SUBJECTIVE AND OBJECTIVE BOX
TEAM Surgery Progress Note  Patient is a 57y old  Female who presents with a chief complaint of Abdominal pain (29 Sep 2022 21:24)      INTERVAL EVENTS: Patient is POD# ***No acute events overnight.  SUBJECTIVE: Patient seen and examined at bedside with surgical team, patient without complaints. Denies fever, chills, CP, SOB nausea, vomiting, abdominal pain.    REVIEW OF SYSTEMS:  Constitutional: No fevers or chills. No malaise or weakness.  EENT: No vision changes. No ear pain. No nasal congestion or rhinitis. No throat pain or dysphagia.  Respiratory: No cough, wheezing, or SOB. No hemoptysis.  Cardiovascular: No chest pain or palpitations.  Gastrointestinal: No abdominal pain. No nausea, vomiting, diarrhea or constipation. No hematochezia. No melena.  Genitourinary: No dysuria, hematuria, or frequency.  Neurologic: No numbness or tingling. No weakness.  Skin: No rashes or pruritus.     OBJECTIVE:    Vital Signs Last 24 Hrs  T(C): 37.3 (29 Sep 2022 22:42), Max: 37.3 (29 Sep 2022 22:42)  T(F): 99.1 (29 Sep 2022 22:42), Max: 99.1 (29 Sep 2022 22:42)  HR: 78 (29 Sep 2022 22:42) (78 - 87)  BP: 118/72 (29 Sep 2022 22:42) (118/72 - 123/57)  BP(mean): --  RR: 18 (29 Sep 2022 22:42) (15 - 18)  SpO2: 100% (29 Sep 2022 22:42) (100% - 100%)    Parameters below as of 29 Sep 2022 22:42  Patient On (Oxygen Delivery Method): room air    I&O's Detail  MEDICATIONS  (STANDING):  acetaminophen     Tablet .. 650 milliGRAM(s) Oral every 6 hours  ciprofloxacin   IVPB 400 milliGRAM(s) IV Intermittent every 12 hours  ciprofloxacin   IVPB      dextrose 5% + sodium chloride 0.45% with potassium chloride 20 mEq/L 1000 milliLiter(s) (125 mL/Hr) IV Continuous <Continuous>  enoxaparin Injectable 40 milliGRAM(s) SubCutaneous every 24 hours  ibuprofen  Tablet. 400 milliGRAM(s) Oral every 6 hours  metroNIDAZOLE  IVPB      metroNIDAZOLE  IVPB 500 milliGRAM(s) IV Intermittent once  metroNIDAZOLE  IVPB 500 milliGRAM(s) IV Intermittent every 8 hours  pantoprazole  Injectable 40 milliGRAM(s) IV Push every 24 hours    MEDICATIONS  (PRN):  oxyCODONE    IR 5 milliGRAM(s) Oral every 6 hours PRN Severe Pain (7 - 10)  oxyCODONE    IR 2.5 milliGRAM(s) Oral every 6 hours PRN Moderate Pain (4 - 6)      PHYSICAL EXAM:  Constitutional: A&Ox3, NAD  Respiratory: Unlabored breathing  Abdomen: Soft, nondistended, NTTP. No rebound or guarding.  Extremities: WWP, ZAPATA spontaneously    LABS:                        12.0   14.23 )-----------( 305      ( 29 Sep 2022 20:10 )             36.5     09-29    132<L>  |  96<L>  |  5<L>  ----------------------------<  98  3.0<L>   |  27  |  0.82    Ca    8.7      29 Sep 2022 20:10    TPro  7.5  /  Alb  3.4  /  TBili  0.7  /  DBili  x   /  AST  80<H>  /  ALT  114<H>  /  AlkPhos  271<H>  09-29    PT/INR - ( 29 Sep 2022 20:10 )   PT: 13.5 sec;   INR: 1.16 ratio         PTT - ( 29 Sep 2022 20:10 )  PTT:30.4 sec  LIVER FUNCTIONS - ( 29 Sep 2022 20:10 )  Alb: 3.4 g/dL / Pro: 7.5 g/dL / ALK PHOS: 271 U/L / ALT: 114 U/L / AST: 80 U/L / GGT: x             ABO Interpretation: O (09-29-22 @ 21:01)      IMAGING:     A TEAM Surgery Progress Note  Patient is a 57y old  Female who presents with a chief complaint of Abdominal pain (29 Sep 2022 21:24)    INTERVAL EVENTS: Admission to surgical floor overnight. Pain decreased since admission.     SUBJECTIVE: Patient seen and examined at bedside with surgical team, patient without complaints. Denies fever, chills, CP, SOB nausea, vomiting, abdominal pain.    REVIEW OF SYSTEMS:  Constitutional: No fevers or chills. No malaise or weakness.  EENT: No vision changes. No ear pain. No nasal congestion or rhinitis. No throat pain or dysphagia.  Respiratory: No cough, wheezing, or SOB. No hemoptysis.  Cardiovascular: No chest pain or palpitations.  Gastrointestinal: No abdominal pain. No nausea, vomiting, diarrhea or constipation. No hematochezia. No melena.  Genitourinary: No dysuria, hematuria, or frequency.  Neurologic: No numbness or tingling. No weakness.  Skin: No rashes or pruritus.     OBJECTIVE:  Vital Signs Last 24 Hrs  T(C): 36.9 (30 Sep 2022 05:45), Max: 37.9 (29 Sep 2022 23:00)  T(F): 98.5 (30 Sep 2022 05:45), Max: 100.3 (29 Sep 2022 23:00)  HR: 61 (30 Sep 2022 05:45) (61 - 89)  BP: 116/61 (30 Sep 2022 05:45) (116/61 - 133/75)  BP(mean): --  RR: 17 (30 Sep 2022 05:45) (15 - 18)  SpO2: 100% (30 Sep 2022 05:45) (100% - 100%)    Parameters below as of 30 Sep 2022 05:45  Patient On (Oxygen Delivery Method): room air    I&O's Summary      MEDICATIONS  (STANDING):  acetaminophen     Tablet .. 650 milliGRAM(s) Oral every 6 hours  ciprofloxacin   IVPB 400 milliGRAM(s) IV Intermittent every 12 hours  ciprofloxacin   IVPB      dextrose 5% + sodium chloride 0.45% with potassium chloride 20 mEq/L 1000 milliLiter(s) (125 mL/Hr) IV Continuous <Continuous>  enoxaparin Injectable 40 milliGRAM(s) SubCutaneous every 24 hours  ibuprofen  Tablet. 400 milliGRAM(s) Oral every 6 hours  metroNIDAZOLE  IVPB      metroNIDAZOLE  IVPB 500 milliGRAM(s) IV Intermittent once  metroNIDAZOLE  IVPB 500 milliGRAM(s) IV Intermittent every 8 hours  pantoprazole  Injectable 40 milliGRAM(s) IV Push every 24 hours    MEDICATIONS  (PRN):  oxyCODONE    IR 5 milliGRAM(s) Oral every 6 hours PRN Severe Pain (7 - 10)  oxyCODONE    IR 2.5 milliGRAM(s) Oral every 6 hours PRN Moderate Pain (4 - 6)      PHYSICAL EXAM:  General: well developed, well nourished, NAD  Neuro: alert and oriented, no focal deficits, moves all extremities spontaneously  HEENT: NCAT, EOMI, anicteric, mucosa moist  Respiratory: airway patent, respirations unlabored  CVS: regular rate  Abd: Soft, tender in epigastrium and RUQ, nondistended  Extremities: no edema, sensation and movement grossly intact      LABS:                          12.0   13.26 )-----------( 300      ( 30 Sep 2022 07:04 )             36.8     09-30    139  |  101  |  6<L>  ----------------------------<  117<H>  3.5   |  27  |  0.89    Ca    8.8      30 Sep 2022 07:04  Phos  3.3     09-30  Mg     2.10     09-30    TPro  6.9  /  Alb  3.4  /  TBili  0.6  /  DBili  x   /  AST  56<H>  /  ALT  101<H>  /  AlkPhos  256<H>  09-30        IMAGING:

## 2022-09-30 NOTE — PRE PROCEDURE NOTE - PRE PROCEDURE EVALUATION
Vascular & Interventional Radiology Pre-Procedure Note    Procedure Name: Percutaneous Cholecystotomy    HPI: 57y Female with acute cholecystitis.    Allergies: NKDA    Medications:  ciprofloxacin   IVPB: 200 mL/Hr IV Intermittent (09-30 @ 05:48)  enoxaparin Injectable: 40 milliGRAM(s) SubCutaneous (09-30 @ 01:11)  metroNIDAZOLE  IVPB: 100 mL/Hr IV Intermittent (09-30 @ 13:56)      Data:  Vital Signs Last 24 Hrs  T(C): 36.6 (30 Sep 2022 16:00), Max: 37.9 (29 Sep 2022 23:00)  T(F): 97.8 (30 Sep 2022 16:00), Max: 100.3 (29 Sep 2022 23:00)  HR: 63 (30 Sep 2022 16:00) (61 - 89)  BP: 108/64 (30 Sep 2022 16:00) (106/66 - 133/75)  BP(mean): --  RR: 18 (30 Sep 2022 16:00) (15 - 18)  SpO2: 98% (30 Sep 2022 16:00) (98% - 100%)    Parameters below as of 30 Sep 2022 16:00  Patient On (Oxygen Delivery Method): room air        LABS:                        12.0   13.26 )-----------( 300      ( 30 Sep 2022 07:04 )             36.8     09-30    139  |  101  |  6<L>  ----------------------------<  117<H>  3.5   |  27  |  0.89    Ca    8.8      30 Sep 2022 07:04  Phos  3.3     09-30  Mg     2.10     09-30    TPro  6.9  /  Alb  3.4  /  TBili  0.6  /  DBili  x   /  AST  56<H>  /  ALT  101<H>  /  AlkPhos  256<H>  09-30  PT/INR - ( 29 Sep 2022 20:10 )   PT: 13.5 sec;   INR: 1.16 ratio    PTT - ( 29 Sep 2022 20:10 )  PTT:30.4 sec    Imaging: US 9/29/22: IMPRESSION:  Findings concerning for acute cholecystitis. Mucosal irregularity and suggestion of sloughed membranes, concerning for gangrenous cholecystitis. Correlate with clinical and laboratory findings.      Plan:   -57y Female presents for percutaneous cholecystostomy.  -Risks/Benefits/alternatives explained with the patient and/or healthcare proxy and witnessed informed consent obtained.

## 2022-09-30 NOTE — PATIENT PROFILE ADULT - FALL HARM RISK - HARM RISK INTERVENTIONS

## 2022-09-30 NOTE — PROCEDURE NOTE - PROCEDURE FINDINGS AND DETAILS
- Successful placement of percutaneous cholecystostomy  - [place] purulent fluid aspirated and sent to laboratory for microbiologic analysis.

## 2022-09-30 NOTE — PROGRESS NOTE ADULT - SUBJECTIVE AND OBJECTIVE BOX
Subjective: Right upper quadrant pain - known gallstones    Objective:   Vital Signs Last 24 Hrs  T(C): 37.1 (30 Sep 2022 21:38), Max: 37.9 (29 Sep 2022 23:00)  T(F): 98.7 (30 Sep 2022 21:38), Max: 100.3 (29 Sep 2022 23:00)  HR: 65 (30 Sep 2022 21:38) (61 - 89)  BP: 110/62 (30 Sep 2022 21:38) (106/66 - 133/75)  BP(mean): --  RR: 17 (30 Sep 2022 21:38) (16 - 18)  SpO2: 98% (30 Sep 2022 21:38) (98% - 100%)    Parameters below as of 30 Sep 2022 16:00  Patient On (Oxygen Delivery Method): room air        Daily     Daily     Heent: N/C, AT PER no scleral icterus, No JVD  Pul: clear  Cor: RRR  Abdomen: soft, right upper quadrant fullness and tenderness - normal BS.   Extremities: without edema, motor/sensory intact    I&O's Detail    30 Sep 2022 07:01  -  30 Sep 2022 22:49  --------------------------------------------------------  IN:    dextrose 5% + sodium chloride 0.45% w/ Additives: 500 mL  Total IN: 500 mL    OUT:    Oral Fluid: 0 mL  Total OUT: 0 mL    Total NET: 500 mL          MEDICATIONS  (STANDING):  acetaminophen     Tablet .. 650 milliGRAM(s) Oral every 6 hours  ciprofloxacin   IVPB 400 milliGRAM(s) IV Intermittent every 12 hours  ciprofloxacin   IVPB      dextrose 5% + sodium chloride 0.45% with potassium chloride 20 mEq/L 1000 milliLiter(s) (125 mL/Hr) IV Continuous <Continuous>  enoxaparin Injectable 40 milliGRAM(s) SubCutaneous every 24 hours  ibuprofen  Tablet. 400 milliGRAM(s) Oral every 6 hours  influenza   Vaccine 0.5 milliLiter(s) IntraMuscular once  metroNIDAZOLE  IVPB      metroNIDAZOLE  IVPB 500 milliGRAM(s) IV Intermittent once  metroNIDAZOLE  IVPB 500 milliGRAM(s) IV Intermittent every 8 hours  pantoprazole  Injectable 40 milliGRAM(s) IV Push every 24 hours    MEDICATIONS  (PRN):  oxyCODONE    IR 5 milliGRAM(s) Oral every 6 hours PRN Severe Pain (7 - 10)  oxyCODONE    IR 2.5 milliGRAM(s) Oral every 6 hours PRN Moderate Pain (4 - 6)                            12.0   13.26 )-----------( 300      ( 30 Sep 2022 07:04 )             36.8     09-30    139  |  101  |  6<L>  ----------------------------<  117<H>  3.5   |  27  |  0.89    Ca    8.8      30 Sep 2022 07:04  Phos  3.3     09-30  Mg     2.10     09-30    TPro  6.9  /  Alb  3.4  /  TBili  0.6  /  DBili  x   /  AST  56<H>  /  ALT  101<H>  /  AlkPhos  256<H>  09-30    PT/INR - ( 29 Sep 2022 20:10 )   PT: 13.5 sec;   INR: 1.16 ratio         PTT - ( 29 Sep 2022 20:10 )  PTT:30.4 sec    Radiologic Studies:< from: US Abdomen Upper Quadrant Right (09.29.22 @ 21:02) >  Bile ducts: Normal caliber. Common bile duct measures 5 mm.  Gallbladder:  Stones and sludge in the gallbladder with nonmobile stone in the   gallbladder neck measuring 1.1 cm.    Thickened gallbladder wall up to 1.2 cm with edema and mild   pericholecystic fluid. Mucosal irregularity and suggestion of sloughed   membranes. No evidence of gas in the gallbladder wall.      < end of copied text >

## 2022-10-01 LAB
ALBUMIN SERPL ELPH-MCNC: 3.4 G/DL — SIGNIFICANT CHANGE UP (ref 3.3–5)
ALP SERPL-CCNC: 246 U/L — HIGH (ref 40–120)
ALT FLD-CCNC: 77 U/L — HIGH (ref 4–33)
ANION GAP SERPL CALC-SCNC: 11 MMOL/L — SIGNIFICANT CHANGE UP (ref 7–14)
AST SERPL-CCNC: 38 U/L — HIGH (ref 4–32)
BILIRUB SERPL-MCNC: 0.5 MG/DL — SIGNIFICANT CHANGE UP (ref 0.2–1.2)
BUN SERPL-MCNC: 3 MG/DL — LOW (ref 7–23)
CALCIUM SERPL-MCNC: 9.4 MG/DL — SIGNIFICANT CHANGE UP (ref 8.4–10.5)
CHLORIDE SERPL-SCNC: 105 MMOL/L — SIGNIFICANT CHANGE UP (ref 98–107)
CO2 SERPL-SCNC: 24 MMOL/L — SIGNIFICANT CHANGE UP (ref 22–31)
CREAT SERPL-MCNC: 0.76 MG/DL — SIGNIFICANT CHANGE UP (ref 0.5–1.3)
EGFR: 92 ML/MIN/1.73M2 — SIGNIFICANT CHANGE UP
GLUCOSE SERPL-MCNC: 103 MG/DL — HIGH (ref 70–99)
GRAM STN FLD: SIGNIFICANT CHANGE UP
HCT VFR BLD CALC: 36.9 % — SIGNIFICANT CHANGE UP (ref 34.5–45)
HGB BLD-MCNC: 12.3 G/DL — SIGNIFICANT CHANGE UP (ref 11.5–15.5)
MAGNESIUM SERPL-MCNC: 1.9 MG/DL — SIGNIFICANT CHANGE UP (ref 1.6–2.6)
MCHC RBC-ENTMCNC: 28.4 PG — SIGNIFICANT CHANGE UP (ref 27–34)
MCHC RBC-ENTMCNC: 33.3 GM/DL — SIGNIFICANT CHANGE UP (ref 32–36)
MCV RBC AUTO: 85.2 FL — SIGNIFICANT CHANGE UP (ref 80–100)
NRBC # BLD: 0 /100 WBCS — SIGNIFICANT CHANGE UP (ref 0–0)
NRBC # FLD: 0 K/UL — SIGNIFICANT CHANGE UP (ref 0–0)
PHOSPHATE SERPL-MCNC: 3.3 MG/DL — SIGNIFICANT CHANGE UP (ref 2.5–4.5)
PLATELET # BLD AUTO: 350 K/UL — SIGNIFICANT CHANGE UP (ref 150–400)
POTASSIUM SERPL-MCNC: 4 MMOL/L — SIGNIFICANT CHANGE UP (ref 3.5–5.3)
POTASSIUM SERPL-SCNC: 4 MMOL/L — SIGNIFICANT CHANGE UP (ref 3.5–5.3)
PROT SERPL-MCNC: 7.1 G/DL — SIGNIFICANT CHANGE UP (ref 6–8.3)
RBC # BLD: 4.33 M/UL — SIGNIFICANT CHANGE UP (ref 3.8–5.2)
RBC # FLD: 13.2 % — SIGNIFICANT CHANGE UP (ref 10.3–14.5)
SODIUM SERPL-SCNC: 140 MMOL/L — SIGNIFICANT CHANGE UP (ref 135–145)
SPECIMEN SOURCE: SIGNIFICANT CHANGE UP
WBC # BLD: 10.38 K/UL — SIGNIFICANT CHANGE UP (ref 3.8–10.5)
WBC # FLD AUTO: 10.38 K/UL — SIGNIFICANT CHANGE UP (ref 3.8–10.5)

## 2022-10-01 RX ORDER — MAGNESIUM SULFATE 500 MG/ML
1 VIAL (ML) INJECTION ONCE
Refills: 0 | Status: COMPLETED | OUTPATIENT
Start: 2022-10-01 | End: 2022-10-01

## 2022-10-01 RX ADMIN — Medication 650 MILLIGRAM(S): at 19:42

## 2022-10-01 RX ADMIN — Medication 100 MILLIGRAM(S): at 13:22

## 2022-10-01 RX ADMIN — Medication 100 MILLIGRAM(S): at 21:49

## 2022-10-01 RX ADMIN — Medication 650 MILLIGRAM(S): at 12:19

## 2022-10-01 RX ADMIN — Medication 400 MILLIGRAM(S): at 18:44

## 2022-10-01 RX ADMIN — Medication 100 MILLIGRAM(S): at 06:19

## 2022-10-01 RX ADMIN — Medication 200 MILLIGRAM(S): at 18:43

## 2022-10-01 RX ADMIN — DEXTROSE MONOHYDRATE, SODIUM CHLORIDE, AND POTASSIUM CHLORIDE 125 MILLILITER(S): 50; .745; 4.5 INJECTION, SOLUTION INTRAVENOUS at 11:16

## 2022-10-01 RX ADMIN — Medication 400 MILLIGRAM(S): at 12:18

## 2022-10-01 RX ADMIN — Medication 400 MILLIGRAM(S): at 12:33

## 2022-10-01 RX ADMIN — PANTOPRAZOLE SODIUM 40 MILLIGRAM(S): 20 TABLET, DELAYED RELEASE ORAL at 06:18

## 2022-10-01 RX ADMIN — Medication 650 MILLIGRAM(S): at 18:43

## 2022-10-01 RX ADMIN — Medication 400 MILLIGRAM(S): at 19:42

## 2022-10-01 RX ADMIN — Medication 650 MILLIGRAM(S): at 12:34

## 2022-10-01 RX ADMIN — Medication 200 MILLIGRAM(S): at 06:19

## 2022-10-01 RX ADMIN — Medication 100 GRAM(S): at 11:12

## 2022-10-01 NOTE — PROGRESS NOTE ADULT - SUBJECTIVE AND OBJECTIVE BOX
OBJECTIVE  T(C): 37.2 (10-01-22 @ 11:08), Max: 37.2 (10-01-22 @ 11:08)  HR: 69 (10-01-22 @ 11:08) (63 - 77)  BP: 133/76 (10-01-22 @ 11:08) (108/64 - 136/85)  RR: 18 (10-01-22 @ 11:08) (17 - 18)  SpO2: 97% (10-01-22 @ 11:08) (97% - 99%)  I&O's Summary    30 Sep 2022 07:01  -  01 Oct 2022 07:00  --------------------------------------------------------  IN: 500 mL / OUT: 0 mL / NET: 500 mL    01 Oct 2022 07:01  -  01 Oct 2022 14:02  --------------------------------------------------------  IN: 950 mL / OUT: 0 mL / NET: 950 mL      I&O's Detail    30 Sep 2022 07:01  -  01 Oct 2022 07:00  --------------------------------------------------------  IN:    dextrose 5% + sodium chloride 0.45% w/ Additives: 500 mL  Total IN: 500 mL    OUT:    Oral Fluid: 0 mL    Voided (mL): 0 mL  Total OUT: 0 mL    Total NET: 500 mL      01 Oct 2022 07:01  -  01 Oct 2022 14:02  --------------------------------------------------------  IN:    dextrose 5% + sodium chloride 0.45% w/ Additives: 750 mL    IV PiggyBack: 100 mL    IV PiggyBack: 100 mL  Total IN: 950 mL    OUT:  Total OUT: 0 mL    Total NET: 950 mL        LABS                        12.3   10.38 )-----------( 350      ( 01 Oct 2022 07:10 )             36.9     10-01    140  |  105  |  3<L>  ----------------------------<  103<H>  4.0   |  24  |  0.76    Ca    9.4      01 Oct 2022 07:10  Phos  3.3     10-01  Mg     1.90     10-01    TPro  7.1  /  Alb  3.4  /  TBili  0.5  /  DBili  x   /  AST  38<H>  /  ALT  77<H>  /  AlkPhos  246<H>  10-01    PT/INR - ( 29 Sep 2022 20:10 )   PT: 13.5 sec;   INR: 1.16 ratio         PTT - ( 29 Sep 2022 20:10 )  PTT:30.4 sec    ORDERS/MEDICATIONS  MEDICATIONS  (STANDING):  acetaminophen     Tablet .. 650 milliGRAM(s) Oral every 6 hours  ciprofloxacin   IVPB 400 milliGRAM(s) IV Intermittent every 12 hours  ciprofloxacin   IVPB      dextrose 5% + sodium chloride 0.45% with potassium chloride 20 mEq/L 1000 milliLiter(s) (125 mL/Hr) IV Continuous <Continuous>  enoxaparin Injectable 40 milliGRAM(s) SubCutaneous every 24 hours  ibuprofen  Tablet. 400 milliGRAM(s) Oral every 6 hours  influenza   Vaccine 0.5 milliLiter(s) IntraMuscular once  metroNIDAZOLE  IVPB      metroNIDAZOLE  IVPB 500 milliGRAM(s) IV Intermittent once  metroNIDAZOLE  IVPB 500 milliGRAM(s) IV Intermittent every 8 hours  pantoprazole  Injectable 40 milliGRAM(s) IV Push every 24 hours    MEDICATIONS  (PRN):  oxyCODONE    IR 5 milliGRAM(s) Oral every 6 hours PRN Severe Pain (7 - 10)  oxyCODONE    IR 2.5 milliGRAM(s) Oral every 6 hours PRN Moderate Pain (4 - 6)    A TEAM Surgery Progress Note  Patient is a 57y old  Female who presents with a chief complaint of Abdominal pain (29 Sep 2022 21:24)    INTERVAL EVENTS: Patient underwent percutaneous cholecystostomy tube placement overnight w IR, post-procedure check performed.     SUBJECTIVE: Patient seen and examined at bedside with surgical team, patient without complaints. Denies fever, chills, CP, SOB nausea, vomiting, abdominal pain.    10-point Review of Systems is negative except for as noted above.       PHYSICAL EXAM:  General: A & O x3, NAD  Neuro: alert and oriented, no focal deficits, moves all extremities spontaneously  Respiratory: airway patent, respirations unlabored  CVS: regular rate  Abd: Soft, nondistended. mild ttp over cholecystostomy tube insertion site, draining purulent fluid.   Extremities: no edema, sensation and movement grossly intact     A TEAM Surgery Progress Note  Patient is a 57y old  Female who presents with a chief complaint of Abdominal pain (29 Sep 2022 21:24)    INTERVAL EVENTS: Patient underwent percutaneous cholecystostomy tube placement overnight w IR, post-procedure check performed.     SUBJECTIVE: Patient seen and examined at bedside with surgical team, patient without complaints. Denies fever, chills, CP, SOB nausea, vomiting, abdominal pain.    10-point Review of Systems is negative except for as noted above.     OBJECTIVE  T(C): 37.2 (10-01-22 @ 11:08), Max: 37.2 (10-01-22 @ 11:08)  HR: 69 (10-01-22 @ 11:08) (63 - 77)  BP: 133/76 (10-01-22 @ 11:08) (108/64 - 136/85)  RR: 18 (10-01-22 @ 11:08) (17 - 18)  SpO2: 97% (10-01-22 @ 11:08) (97% - 99%)  I&O's Summary    30 Sep 2022 07:01  -  01 Oct 2022 07:00  --------------------------------------------------------  IN: 500 mL / OUT: 0 mL / NET: 500 mL    01 Oct 2022 07:01  -  01 Oct 2022 14:02  --------------------------------------------------------  IN: 950 mL / OUT: 0 mL / NET: 950 mL      I&O's Detail    30 Sep 2022 07:01  -  01 Oct 2022 07:00  --------------------------------------------------------  IN:    dextrose 5% + sodium chloride 0.45% w/ Additives: 500 mL  Total IN: 500 mL    OUT:    Oral Fluid: 0 mL    Voided (mL): 0 mL  Total OUT: 0 mL    Total NET: 500 mL      01 Oct 2022 07:01  -  01 Oct 2022 14:02  --------------------------------------------------------  IN:    dextrose 5% + sodium chloride 0.45% w/ Additives: 750 mL    IV PiggyBack: 100 mL    IV PiggyBack: 100 mL  Total IN: 950 mL    OUT:  Total OUT: 0 mL    Total NET: 950 mL        LABS                        12.3   10.38 )-----------( 350      ( 01 Oct 2022 07:10 )             36.9     10-01    140  |  105  |  3<L>  ----------------------------<  103<H>  4.0   |  24  |  0.76    Ca    9.4      01 Oct 2022 07:10  Phos  3.3     10-01  Mg     1.90     10-01    TPro  7.1  /  Alb  3.4  /  TBili  0.5  /  DBili  x   /  AST  38<H>  /  ALT  77<H>  /  AlkPhos  246<H>  10-01    PT/INR - ( 29 Sep 2022 20:10 )   PT: 13.5 sec;   INR: 1.16 ratio         PTT - ( 29 Sep 2022 20:10 )  PTT:30.4 sec    ORDERS/MEDICATIONS  MEDICATIONS  (STANDING):  acetaminophen     Tablet .. 650 milliGRAM(s) Oral every 6 hours  ciprofloxacin   IVPB 400 milliGRAM(s) IV Intermittent every 12 hours  ciprofloxacin   IVPB      dextrose 5% + sodium chloride 0.45% with potassium chloride 20 mEq/L 1000 milliLiter(s) (125 mL/Hr) IV Continuous <Continuous>  enoxaparin Injectable 40 milliGRAM(s) SubCutaneous every 24 hours  ibuprofen  Tablet. 400 milliGRAM(s) Oral every 6 hours  influenza   Vaccine 0.5 milliLiter(s) IntraMuscular once  metroNIDAZOLE  IVPB      metroNIDAZOLE  IVPB 500 milliGRAM(s) IV Intermittent once  metroNIDAZOLE  IVPB 500 milliGRAM(s) IV Intermittent every 8 hours  pantoprazole  Injectable 40 milliGRAM(s) IV Push every 24 hours    MEDICATIONS  (PRN):  oxyCODONE    IR 5 milliGRAM(s) Oral every 6 hours PRN Severe Pain (7 - 10)  oxyCODONE    IR 2.5 milliGRAM(s) Oral every 6 hours PRN Moderate Pain (4 - 6)    PHYSICAL EXAM:  General: A & O x3, NAD  Neuro: alert and oriented, no focal deficits, moves all extremities spontaneously  Respiratory: airway patent, respirations unlabored  CVS: regular rate  Abd: Soft, nondistended. mild ttp over cholecystostomy tube insertion site, draining purulent fluid into bag w/out leakage.  Extremities: no edema, sensation and movement grossly intact

## 2022-10-01 NOTE — PROGRESS NOTE ADULT - ASSESSMENT
Assessment: 57F PMH biliary colic with cholelithiasis, hiatal hernia, GERD presenting with abdominal pain since 9/23. S/P cholecystostomy tube placement w IR 9/30.     Plan:  - CLD started 10/1  - Cipro/Flagyl  - PO pain medication  - Lovenox  - Protonix        A Team Surgery   [id number]

## 2022-10-01 NOTE — PROGRESS NOTE ADULT - SUBJECTIVE AND OBJECTIVE BOX
Subjective: I have much less pain.    Objective:   Vital Signs Last 24 Hrs  T(C): 37.2 (01 Oct 2022 11:08), Max: 37.2 (01 Oct 2022 11:08)  T(F): 98.9 (01 Oct 2022 11:08), Max: 98.9 (01 Oct 2022 11:08)  HR: 69 (01 Oct 2022 11:08) (63 - 77)  BP: 133/76 (01 Oct 2022 11:08) (108/64 - 136/85)  BP(mean): --  RR: 18 (01 Oct 2022 11:08) (17 - 18)  SpO2: 97% (01 Oct 2022 11:08) (97% - 99%)    Parameters below as of 01 Oct 2022 11:08  Patient On (Oxygen Delivery Method): room air        Daily     Daily     Heent: N/C, AT PER no scleral icterus, No JVD  Pul: clear  Cor: RRR  Abdomen: soft, normal BS. Cholecystostomy tube to drainage with purulent discharge  Extremities: without edema, motor/sensory intact    I&O's Detail    30 Sep 2022 07:01  -  01 Oct 2022 07:00  --------------------------------------------------------  IN:    dextrose 5% + sodium chloride 0.45% w/ Additives: 500 mL  Total IN: 500 mL    OUT:    Oral Fluid: 0 mL    Voided (mL): 0 mL  Total OUT: 0 mL    Total NET: 500 mL      01 Oct 2022 07:01  -  01 Oct 2022 15:07  --------------------------------------------------------  IN:    dextrose 5% + sodium chloride 0.45% w/ Additives: 750 mL    IV PiggyBack: 100 mL    IV PiggyBack: 100 mL  Total IN: 950 mL    OUT:  Total OUT: 0 mL    Total NET: 950 mL          MEDICATIONS  (STANDING):  acetaminophen     Tablet .. 650 milliGRAM(s) Oral every 6 hours  ciprofloxacin   IVPB 400 milliGRAM(s) IV Intermittent every 12 hours  ciprofloxacin   IVPB      dextrose 5% + sodium chloride 0.45% with potassium chloride 20 mEq/L 1000 milliLiter(s) (125 mL/Hr) IV Continuous <Continuous>  enoxaparin Injectable 40 milliGRAM(s) SubCutaneous every 24 hours  ibuprofen  Tablet. 400 milliGRAM(s) Oral every 6 hours  influenza   Vaccine 0.5 milliLiter(s) IntraMuscular once  metroNIDAZOLE  IVPB 500 milliGRAM(s) IV Intermittent every 8 hours  metroNIDAZOLE  IVPB 500 milliGRAM(s) IV Intermittent once  metroNIDAZOLE  IVPB      pantoprazole  Injectable 40 milliGRAM(s) IV Push every 24 hours    MEDICATIONS  (PRN):  oxyCODONE    IR 5 milliGRAM(s) Oral every 6 hours PRN Severe Pain (7 - 10)  oxyCODONE    IR 2.5 milliGRAM(s) Oral every 6 hours PRN Moderate Pain (4 - 6)                            12.3   10.38 )-----------( 350      ( 01 Oct 2022 07:10 )             36.9     10-01    140  |  105  |  3<L>  ----------------------------<  103<H>  4.0   |  24  |  0.76    Ca    9.4      01 Oct 2022 07:10  Phos  3.3     10-01  Mg     1.90     10-01    TPro  7.1  /  Alb  3.4  /  TBili  0.5  /  DBili  x   /  AST  38<H>  /  ALT  77<H>  /  AlkPhos  246<H>  10-01    PT/INR - ( 29 Sep 2022 20:10 )   PT: 13.5 sec;   INR: 1.16 ratio         PTT - ( 29 Sep 2022 20:10 )  PTT:30.4 sec    Radiologic Studies:

## 2022-10-01 NOTE — PROGRESS NOTE ADULT - SUBJECTIVE AND OBJECTIVE BOX
pt seen and examined, no complaints, ROS - .     acetaminophen     Tablet .. 650 milliGRAM(s) Oral every 6 hours  ciprofloxacin   IVPB 400 milliGRAM(s) IV Intermittent every 12 hours  ciprofloxacin   IVPB      dextrose 5% + sodium chloride 0.45% with potassium chloride 20 mEq/L 1000 milliLiter(s) IV Continuous <Continuous>  enoxaparin Injectable 40 milliGRAM(s) SubCutaneous every 24 hours  ibuprofen  Tablet. 400 milliGRAM(s) Oral every 6 hours  influenza   Vaccine 0.5 milliLiter(s) IntraMuscular once  metroNIDAZOLE  IVPB      metroNIDAZOLE  IVPB 500 milliGRAM(s) IV Intermittent once  metroNIDAZOLE  IVPB 500 milliGRAM(s) IV Intermittent every 8 hours  oxyCODONE    IR 5 milliGRAM(s) Oral every 6 hours PRN  oxyCODONE    IR 2.5 milliGRAM(s) Oral every 6 hours PRN  pantoprazole  Injectable 40 milliGRAM(s) IV Push every 24 hours                            12.3   10.38 )-----------( 350      ( 01 Oct 2022 07:10 )             36.9       Hemoglobin: 12.3 g/dL (10-01 @ 07:10)  Hemoglobin: 12.0 g/dL (09-30 @ 07:04)  Hemoglobin: 12.0 g/dL (09-29 @ 20:10)      10-01    140  |  105  |  3<L>  ----------------------------<  103<H>  4.0   |  24  |  0.76    Ca    9.4      01 Oct 2022 07:10  Phos  3.3     10-01  Mg     1.90     10-01    TPro  7.1  /  Alb  3.4  /  TBili  0.5  /  DBili  x   /  AST  38<H>  /  ALT  77<H>  /  AlkPhos  246<H>  10-01    Creatinine Trend: 0.76<--, 0.89<--, 0.82<--    COAGS:           T(C): 37.2 (10-01-22 @ 11:08), Max: 37.2 (10-01-22 @ 11:08)  HR: 69 (10-01-22 @ 11:08) (63 - 77)  BP: 133/76 (10-01-22 @ 11:08) (108/64 - 136/85)  RR: 18 (10-01-22 @ 11:08) (17 - 18)  SpO2: 97% (10-01-22 @ 11:08) (97% - 99%)  Wt(kg): --    I&O's Summary    30 Sep 2022 07:01  -  01 Oct 2022 07:00  --------------------------------------------------------  IN: 500 mL / OUT: 0 mL / NET: 500 mL    01 Oct 2022 07:01  -  01 Oct 2022 11:29  --------------------------------------------------------  IN: 600 mL / OUT: 0 mL / NET: 600 mL      Gen: Appears well in NAD  HEENT:  (-)icterus (-)pallor  CV: N S1 S2 1/6 SINCERE (+)2 Pulses B/l  Resp:  Clear to ausculatation B/L, normal effort  GI: (+) BS Soft, NT, ND  Lymph:  (-)Edema, (-)obvious lymphadenopathy  Skin: Warm to touch, Normal turgor  Psych: Appropriate mood and affect    ECG: n/a 	      ASSESSMENT/PLAN: 	57F pmh biliary colic with cholelithiasis, hiatal hernia, GERD presenting with abdominal pain since 9/23. s/p perc kameron.     --asked to follow patient by Dr Kothari, PMD  -- cont ABx .   -- IV hydration .   --F/u surgery            Date of service 10/1   pt seen and examined, no complaints, ROS - .     acetaminophen     Tablet .. 650 milliGRAM(s) Oral every 6 hours  ciprofloxacin   IVPB 400 milliGRAM(s) IV Intermittent every 12 hours  ciprofloxacin   IVPB      dextrose 5% + sodium chloride 0.45% with potassium chloride 20 mEq/L 1000 milliLiter(s) IV Continuous <Continuous>  enoxaparin Injectable 40 milliGRAM(s) SubCutaneous every 24 hours  ibuprofen  Tablet. 400 milliGRAM(s) Oral every 6 hours  influenza   Vaccine 0.5 milliLiter(s) IntraMuscular once  metroNIDAZOLE  IVPB      metroNIDAZOLE  IVPB 500 milliGRAM(s) IV Intermittent once  metroNIDAZOLE  IVPB 500 milliGRAM(s) IV Intermittent every 8 hours  oxyCODONE    IR 5 milliGRAM(s) Oral every 6 hours PRN  oxyCODONE    IR 2.5 milliGRAM(s) Oral every 6 hours PRN  pantoprazole  Injectable 40 milliGRAM(s) IV Push every 24 hours                            12.3   10.38 )-----------( 350      ( 01 Oct 2022 07:10 )             36.9       Hemoglobin: 12.3 g/dL (10-01 @ 07:10)  Hemoglobin: 12.0 g/dL (09-30 @ 07:04)  Hemoglobin: 12.0 g/dL (09-29 @ 20:10)      10-01    140  |  105  |  3<L>  ----------------------------<  103<H>  4.0   |  24  |  0.76    Ca    9.4      01 Oct 2022 07:10  Phos  3.3     10-01  Mg     1.90     10-01    TPro  7.1  /  Alb  3.4  /  TBili  0.5  /  DBili  x   /  AST  38<H>  /  ALT  77<H>  /  AlkPhos  246<H>  10-01    Creatinine Trend: 0.76<--, 0.89<--, 0.82<--    COAGS:           T(C): 37.2 (10-01-22 @ 11:08), Max: 37.2 (10-01-22 @ 11:08)  HR: 69 (10-01-22 @ 11:08) (63 - 77)  BP: 133/76 (10-01-22 @ 11:08) (108/64 - 136/85)  RR: 18 (10-01-22 @ 11:08) (17 - 18)  SpO2: 97% (10-01-22 @ 11:08) (97% - 99%)  Wt(kg): --    I&O's Summary    30 Sep 2022 07:01  -  01 Oct 2022 07:00  --------------------------------------------------------  IN: 500 mL / OUT: 0 mL / NET: 500 mL    01 Oct 2022 07:01  -  01 Oct 2022 11:29  --------------------------------------------------------  IN: 600 mL / OUT: 0 mL / NET: 600 mL      Gen: Appears well in NAD  HEENT:  (-)icterus (-)pallor  CV: N S1 S2 1/6 SINCERE (+)2 Pulses B/l  Resp:  Clear to ausculatation B/L, normal effort  GI: (+) BS Soft, NT, ND  Lymph:  (-)Edema, (-)obvious lymphadenopathy  Skin: Warm to touch, Normal turgor  Psych: Appropriate mood and affect    ECG: n/a 	      ASSESSMENT/PLAN: 	57F pmh biliary colic with cholelithiasis, hiatal hernia, GERD presenting with abdominal pain since 9/23. s/p perc kameron.     --asked to follow patient by Dr Jeffrey, PMD  -- cont ABx .   -- IV hydration .   --F/u surgery

## 2022-10-02 ENCOUNTER — TRANSCRIPTION ENCOUNTER (OUTPATIENT)
Age: 57
End: 2022-10-02

## 2022-10-02 VITALS
RESPIRATION RATE: 18 BRPM | OXYGEN SATURATION: 99 % | SYSTOLIC BLOOD PRESSURE: 134 MMHG | DIASTOLIC BLOOD PRESSURE: 80 MMHG | TEMPERATURE: 99 F | HEART RATE: 63 BPM

## 2022-10-02 LAB
-  AMIKACIN: SIGNIFICANT CHANGE UP
-  AMOXICILLIN/CLAVULANIC ACID: SIGNIFICANT CHANGE UP
-  AMPICILLIN/SULBACTAM: SIGNIFICANT CHANGE UP
-  AMPICILLIN: SIGNIFICANT CHANGE UP
-  AZTREONAM: SIGNIFICANT CHANGE UP
-  CEFAZOLIN: SIGNIFICANT CHANGE UP
-  CEFEPIME: SIGNIFICANT CHANGE UP
-  CEFOXITIN: SIGNIFICANT CHANGE UP
-  CEFTRIAXONE: SIGNIFICANT CHANGE UP
-  CIPROFLOXACIN: SIGNIFICANT CHANGE UP
-  ERTAPENEM: SIGNIFICANT CHANGE UP
-  GENTAMICIN: SIGNIFICANT CHANGE UP
-  IMIPENEM: SIGNIFICANT CHANGE UP
-  LEVOFLOXACIN: SIGNIFICANT CHANGE UP
-  MEROPENEM: SIGNIFICANT CHANGE UP
-  PIPERACILLIN/TAZOBACTAM: SIGNIFICANT CHANGE UP
-  TOBRAMYCIN: SIGNIFICANT CHANGE UP
-  TRIMETHOPRIM/SULFAMETHOXAZOLE: SIGNIFICANT CHANGE UP
ALBUMIN SERPL ELPH-MCNC: 3 G/DL — LOW (ref 3.3–5)
ALP SERPL-CCNC: 233 U/L — HIGH (ref 40–120)
ALT FLD-CCNC: 70 U/L — HIGH (ref 4–33)
ANION GAP SERPL CALC-SCNC: 11 MMOL/L — SIGNIFICANT CHANGE UP (ref 7–14)
AST SERPL-CCNC: 49 U/L — HIGH (ref 4–32)
BILIRUB SERPL-MCNC: 0.4 MG/DL — SIGNIFICANT CHANGE UP (ref 0.2–1.2)
BUN SERPL-MCNC: 3 MG/DL — LOW (ref 7–23)
CALCIUM SERPL-MCNC: 8.9 MG/DL — SIGNIFICANT CHANGE UP (ref 8.4–10.5)
CHLORIDE SERPL-SCNC: 104 MMOL/L — SIGNIFICANT CHANGE UP (ref 98–107)
CO2 SERPL-SCNC: 25 MMOL/L — SIGNIFICANT CHANGE UP (ref 22–31)
CREAT SERPL-MCNC: 0.76 MG/DL — SIGNIFICANT CHANGE UP (ref 0.5–1.3)
EGFR: 92 ML/MIN/1.73M2 — SIGNIFICANT CHANGE UP
GLUCOSE SERPL-MCNC: 95 MG/DL — SIGNIFICANT CHANGE UP (ref 70–99)
HCT VFR BLD CALC: 34.6 % — SIGNIFICANT CHANGE UP (ref 34.5–45)
HGB BLD-MCNC: 11.8 G/DL — SIGNIFICANT CHANGE UP (ref 11.5–15.5)
MAGNESIUM SERPL-MCNC: 1.9 MG/DL — SIGNIFICANT CHANGE UP (ref 1.6–2.6)
MCHC RBC-ENTMCNC: 29.1 PG — SIGNIFICANT CHANGE UP (ref 27–34)
MCHC RBC-ENTMCNC: 34.1 GM/DL — SIGNIFICANT CHANGE UP (ref 32–36)
MCV RBC AUTO: 85.4 FL — SIGNIFICANT CHANGE UP (ref 80–100)
METHOD TYPE: SIGNIFICANT CHANGE UP
NRBC # BLD: 0 /100 WBCS — SIGNIFICANT CHANGE UP (ref 0–0)
NRBC # FLD: 0 K/UL — SIGNIFICANT CHANGE UP (ref 0–0)
PHOSPHATE SERPL-MCNC: 3.9 MG/DL — SIGNIFICANT CHANGE UP (ref 2.5–4.5)
PLATELET # BLD AUTO: 392 K/UL — SIGNIFICANT CHANGE UP (ref 150–400)
POTASSIUM SERPL-MCNC: 3.9 MMOL/L — SIGNIFICANT CHANGE UP (ref 3.5–5.3)
POTASSIUM SERPL-SCNC: 3.9 MMOL/L — SIGNIFICANT CHANGE UP (ref 3.5–5.3)
PROT SERPL-MCNC: 6.3 G/DL — SIGNIFICANT CHANGE UP (ref 6–8.3)
RBC # BLD: 4.05 M/UL — SIGNIFICANT CHANGE UP (ref 3.8–5.2)
RBC # FLD: 13 % — SIGNIFICANT CHANGE UP (ref 10.3–14.5)
SODIUM SERPL-SCNC: 140 MMOL/L — SIGNIFICANT CHANGE UP (ref 135–145)
WBC # BLD: 8.13 K/UL — SIGNIFICANT CHANGE UP (ref 3.8–10.5)
WBC # FLD AUTO: 8.13 K/UL — SIGNIFICANT CHANGE UP (ref 3.8–10.5)

## 2022-10-02 RX ORDER — CIPROFLOXACIN LACTATE 400MG/40ML
1 VIAL (ML) INTRAVENOUS
Qty: 20 | Refills: 0
Start: 2022-10-02 | End: 2022-10-11

## 2022-10-02 RX ORDER — IBUPROFEN 200 MG
1 TABLET ORAL
Qty: 0 | Refills: 0 | DISCHARGE
Start: 2022-10-02

## 2022-10-02 RX ORDER — ACETAMINOPHEN 500 MG
2 TABLET ORAL
Qty: 0 | Refills: 0 | DISCHARGE
Start: 2022-10-02

## 2022-10-02 RX ORDER — MAGNESIUM SULFATE 500 MG/ML
2 VIAL (ML) INJECTION ONCE
Refills: 0 | Status: COMPLETED | OUTPATIENT
Start: 2022-10-02 | End: 2022-10-02

## 2022-10-02 RX ORDER — METRONIDAZOLE 500 MG
1 TABLET ORAL
Qty: 30 | Refills: 0
Start: 2022-10-02 | End: 2022-10-11

## 2022-10-02 RX ADMIN — Medication 100 MILLIGRAM(S): at 05:56

## 2022-10-02 RX ADMIN — Medication 650 MILLIGRAM(S): at 12:11

## 2022-10-02 RX ADMIN — PANTOPRAZOLE SODIUM 40 MILLIGRAM(S): 20 TABLET, DELAYED RELEASE ORAL at 06:05

## 2022-10-02 RX ADMIN — Medication 400 MILLIGRAM(S): at 12:11

## 2022-10-02 RX ADMIN — Medication 100 MILLIGRAM(S): at 13:56

## 2022-10-02 RX ADMIN — Medication 650 MILLIGRAM(S): at 11:41

## 2022-10-02 RX ADMIN — Medication 400 MILLIGRAM(S): at 11:42

## 2022-10-02 RX ADMIN — ENOXAPARIN SODIUM 40 MILLIGRAM(S): 100 INJECTION SUBCUTANEOUS at 06:05

## 2022-10-02 RX ADMIN — Medication 25 GRAM(S): at 11:40

## 2022-10-02 RX ADMIN — Medication 200 MILLIGRAM(S): at 05:56

## 2022-10-02 NOTE — PROGRESS NOTE ADULT - ASSESSMENT
Assessment: 57F PMH biliary colic with cholelithiasis, hiatal hernia, GERD presenting with abdominal pain since 9/23. S/P cholecystostomy tube placement w IR 9/30.     Plan:  - CLD started 10/1  - Diet tolerating well  - Cipro/Flagyl  - PO pain medication  - Lovenox  - Possible discharge to home today vs tomorrow on PO antibiotics      [facility] Surgery   [id number]

## 2022-10-02 NOTE — DISCHARGE NOTE NURSING/CASE MANAGEMENT/SOCIAL WORK - NSDCPEFALRISK_GEN_ALL_CORE
For information on Fall & Injury Prevention, visit: [email or web address] OR  [email or web address] OR  https://www.cdc.gov/steadi/patient.html

## 2022-10-02 NOTE — PROGRESS NOTE ADULT - SUBJECTIVE AND OBJECTIVE BOX
Subjective: I feel much better. I have a slight pain in the right upper quadrant, well controlled with acetaminophen and ibuprofen.    Objective:   Vital Signs Last 24 Hrs  T(C): 37 (02 Oct 2022 17:00), Max: 37.1 (02 Oct 2022 09:19)  T(F): 98.6 (02 Oct 2022 17:00), Max: 98.8 (02 Oct 2022 14:22)  HR: 63 (02 Oct 2022 17:00) (54 - 84)  BP: 134/80 (02 Oct 2022 17:00) (126/72 - 155/83)  BP(mean): --  RR: 18 (02 Oct 2022 17:00) (17 - 18)  SpO2: 99% (02 Oct 2022 17:00) (97% - 100%)    Parameters below as of 02 Oct 2022 17:00  Patient On (Oxygen Delivery Method): room air        Daily     Daily     Heent: N/C, AT PER no scleral icterus, No JVD  Pul: clear  Cor: RRR  Abdomen: soft, normal BS. Right upper quadrant drain with small amount of purulent discharge.  Extremities: without edema, motor/sensory intact    I&O's Detail    01 Oct 2022 07:01  -  02 Oct 2022 07:00  --------------------------------------------------------  IN:    dextrose 5% + sodium chloride 0.45% w/ Additives: 750 mL    IV PiggyBack: 300 mL    IV PiggyBack: 300 mL    IV PiggyBack: 100 mL    Oral Fluid: 500 mL  Total IN: 1950 mL    OUT:    Drain (mL): 30 mL    Voided (mL): 0 mL  Total OUT: 30 mL    Total NET: 1920 mL          MEDICATIONS  (STANDING):  acetaminophen     Tablet .. 650 milliGRAM(s) Oral every 6 hours  ciprofloxacin   IVPB 400 milliGRAM(s) IV Intermittent every 12 hours  ciprofloxacin   IVPB      enoxaparin Injectable 40 milliGRAM(s) SubCutaneous every 24 hours  ibuprofen  Tablet. 400 milliGRAM(s) Oral every 6 hours  influenza   Vaccine 0.5 milliLiter(s) IntraMuscular once  metroNIDAZOLE  IVPB      metroNIDAZOLE  IVPB 500 milliGRAM(s) IV Intermittent every 8 hours  pantoprazole  Injectable 40 milliGRAM(s) IV Push every 24 hours    MEDICATIONS  (PRN):  oxyCODONE    IR 5 milliGRAM(s) Oral every 6 hours PRN Severe Pain (7 - 10)  oxyCODONE    IR 2.5 milliGRAM(s) Oral every 6 hours PRN Moderate Pain (4 - 6)                            11.8   8.13  )-----------( 392      ( 02 Oct 2022 05:38 )             34.6     10-02    140  |  104  |  3<L>  ----------------------------<  95  3.9   |  25  |  0.76    Ca    8.9      02 Oct 2022 05:38  Phos  3.9     10-02  Mg     1.90     10-02    TPro  6.3  /  Alb  3.0<L>  /  TBili  0.4  /  DBili  x   /  AST  49<H>  /  ALT  70<H>  /  AlkPhos  233<H>  10-02        Radiologic Studies:

## 2022-10-02 NOTE — DISCHARGE NOTE PROVIDER - CARE PROVIDER_API CALL
Azucena Martínez)  Surgery  [address], [address]  [place] [address]  Phone: [phone]  Fax: [phone]  Follow Up Time: 1 week    Halaibeh, Mohammad A (MD)  Radiology General  [facility]-[address] [place] [id number]  Phone: [phone]  Fax: [phone]  Follow Up Time: 1 week

## 2022-10-02 NOTE — DISCHARGE NOTE PROVIDER - NSDCMRMEDTOKEN_GEN_ALL_CORE_FT
acetaminophen 325 mg oral tablet: 2 tab(s) orally every 6 hours  ciprofloxacin 500 mg oral tablet: 1 tab(s) orally 2 times a day   ibuprofen 400 mg oral tablet: 1 tab(s) orally every 6 hours  metroNIDAZOLE 500 mg oral tablet: 1 tab(s) orally every 8 hours   omeprazole 40 mg oral delayed release capsule: 1 cap(s) orally once a day

## 2022-10-02 NOTE — PROGRESS NOTE ADULT - NS ATTEND AMEND GEN_ALL_CORE FT
no new post-op cardiovascular issues. will follow with you.  doing well so far.
feeling well. no new CV symptoms.  agree she is stable for discharge planning.

## 2022-10-02 NOTE — DISCHARGE NOTE NURSING/CASE MANAGEMENT/SOCIAL WORK - PATIENT PORTAL LINK FT
You can access the FollowMyHealth Patient Portal offered by [facility] by registering at the following website: [email or web address]. By joining [facility]’s FollowMyHealth portal, you will also be able to view your health information using other applications (apps) compatible with our system.

## 2022-10-02 NOTE — DISCHARGE NOTE PROVIDER - PROVIDER TOKENS
PROVIDER:[TOKEN:[[id number]],FOLLOWUP:[1 week]],PROVIDER:[TOKEN:[[id number]],FOLLOWUP:[1 week]]

## 2022-10-02 NOTE — PROGRESS NOTE ADULT - SUBJECTIVE AND OBJECTIVE BOX
Date of service 10/2     pt seen and examined, no complaints, ROS - .           acetaminophen     Tablet .. 650 milliGRAM(s) Oral every 6 hours  ciprofloxacin   IVPB 400 milliGRAM(s) IV Intermittent every 12 hours  ciprofloxacin   IVPB      enoxaparin Injectable 40 milliGRAM(s) SubCutaneous every 24 hours  ibuprofen  Tablet. 400 milliGRAM(s) Oral every 6 hours  influenza   Vaccine 0.5 milliLiter(s) IntraMuscular once  magnesium sulfate  IVPB 2 Gram(s) IV Intermittent once  metroNIDAZOLE  IVPB      metroNIDAZOLE  IVPB 500 milliGRAM(s) IV Intermittent once  metroNIDAZOLE  IVPB 500 milliGRAM(s) IV Intermittent every 8 hours  oxyCODONE    IR 5 milliGRAM(s) Oral every 6 hours PRN  oxyCODONE    IR 2.5 milliGRAM(s) Oral every 6 hours PRN  pantoprazole  Injectable 40 milliGRAM(s) IV Push every 24 hours                            11.8   8.13  )-----------( 392      ( 02 Oct 2022 05:38 )             34.6       Hemoglobin: 11.8 g/dL (10-02 @ 05:38)  Hemoglobin: 12.3 g/dL (10-01 @ 07:10)  Hemoglobin: 12.0 g/dL (09-30 @ 07:04)  Hemoglobin: 12.0 g/dL (09-29 @ 20:10)      10-02    140  |  104  |  3<L>  ----------------------------<  95  3.9   |  25  |  0.76    Ca    8.9      02 Oct 2022 05:38  Phos  3.9     10-02  Mg     1.90     10-02    TPro  6.3  /  Alb  3.0<L>  /  TBili  0.4  /  DBili  x   /  AST  49<H>  /  ALT  70<H>  /  AlkPhos  233<H>  10-02    Creatinine Trend: 0.76<--, 0.76<--, 0.89<--, 0.82<--    COAGS:           T(C): 37.1 (10-02-22 @ 09:19), Max: 37.2 (10-01-22 @ 11:08)  HR: 84 (10-02-22 @ 09:19) (54 - 84)  BP: 128/78 (10-02-22 @ 09:19) (128/78 - 155/83)  RR: 98 (10-02-22 @ 09:19) (17 - 98)  SpO2: 97% (10-02-22 @ 06:27) (97% - 100%)  Wt(kg): --    I&O's Summary    01 Oct 2022 07:01  -  02 Oct 2022 07:00  --------------------------------------------------------  IN: 1950 mL / OUT: 30 mL / NET: 1920 mL        Gen: Appears well in NAD  HEENT:  (-)icterus (-)pallor  CV: N S1 S2 1/6 SINCERE (+)2 Pulses B/l  Resp:  Clear to ausculatation B/L, normal effort  GI: (+) BS Soft, NT, ND  Lymph:  (-)Edema, (-)obvious lymphadenopathy  Skin: Warm to touch, Normal turgor  Psych: Appropriate mood and affect    ECG: n/a 	      ASSESSMENT/PLAN: 	57F pmh biliary colic with cholelithiasis, hiatal hernia, GERD presenting with abdominal pain since 9/23. s/p perc kameron.     --asked to follow patient by Dr Jeffrey, PMD  -- cont ABx PO   -- IV hydration .    -- per surgery , d/c planning,

## 2022-10-02 NOTE — DISCHARGE NOTE NURSING/CASE MANAGEMENT/SOCIAL WORK - NSDCVIVACCINE_GEN_ALL_CORE_FT
Tdap; 03-Feb-2018 17:20; Nedra Bowles (RN); [employer]; [phone]; IntraMuscular; Deltoid Right.; 0.5 milliLiter(s); VIS (VIS Published: 09-May-2013, VIS Presented: 03-Feb-2018);

## 2022-10-02 NOTE — PROGRESS NOTE ADULT - NSPROGADDITIONALINFOA_GEN_ALL_CORE
Given the image of probable gangrene of the gallbladder and given the marked thickness, a laparoscopic cholecystectomy is unlikely to be successful and would have to be converted to an open procedure. Even then,, one may need to either place a cholecystostomy drain or performed a Thorek procedure.  Therefore, we made the request for a percutaneous cholecystostomy. After resolution of the inflammatory process, a laparoscopic cholecystectomy will be carried out in 8 to 12 weeks.
No evidence of sepsis  To continue IV antibiotics
To discharge home on oral Cipro and Flagyl with instructions for drain care.  Return to my office in 1 week  Sonogram of the abdomen in 4 weeks.

## 2022-10-02 NOTE — DISCHARGE NOTE PROVIDER - HOSPITAL COURSE
58yo F who presented to the ED 9/29 with complaint of "hot" abdominal pain that was worsened by PO intake. Also reported nausea and vomiting in the week prior to admission with dark urine. Denies any fevers w concern for acute cholecystitis. Outpatient RUQ ultrasound demonstrated concern for gangrenous gallbladder and empyema, making her a poor surgical candidate. Underwent IR-guided percutaneous cholecystostomy tube placement on 9/30. She tolerated the procedure well and continued to recover well on the floor.     At the time of discharge, the patient was hemodynamically stable, tolerating PO diet, voiding urine, passing stool, ambulating and was comfortable with adequate pain control. The patient was instructed to follow up with Dr. Martínez within 1-2 weeks after discharge. The patient/family felt comfortable with discharge. The patient was discharged to home. The patient had no other issues.

## 2022-10-02 NOTE — PROGRESS NOTE ADULT - SUBJECTIVE AND OBJECTIVE BOX
GENERAL SURGERY PROGRESS NOTE    STATUS POST:    POST OPERATIVE DAY #:       SUBJECTIVE    OVERNIGHT EVENTS:    10-point review of systems completed and negative except as noted above.      OBJECTIVE    MEDICATIONS  acetaminophen     Tablet .. 650 milliGRAM(s) Oral every 6 hours  ciprofloxacin   IVPB      ciprofloxacin   IVPB 400 milliGRAM(s) IV Intermittent every 12 hours  enoxaparin Injectable 40 milliGRAM(s) SubCutaneous every 24 hours  ibuprofen  Tablet. 400 milliGRAM(s) Oral every 6 hours  influenza   Vaccine 0.5 milliLiter(s) IntraMuscular once  metroNIDAZOLE  IVPB      metroNIDAZOLE  IVPB 500 milliGRAM(s) IV Intermittent once  metroNIDAZOLE  IVPB 500 milliGRAM(s) IV Intermittent every 8 hours  oxyCODONE    IR 5 milliGRAM(s) Oral every 6 hours PRN  oxyCODONE    IR 2.5 milliGRAM(s) Oral every 6 hours PRN  pantoprazole  Injectable 40 milliGRAM(s) IV Push every 24 hours      PHYSICAL EXAM  T(C): 36.8 (10-02-22 @ 01:45), Max: 37.2 (10-01-22 @ 11:08)  HR: 81 (10-02-22 @ 01:45) (54 - 81)  BP: 137/79 (10-02-22 @ 01:45) (130/65 - 155/83)  RR: 18 (10-02-22 @ 01:45) (17 - 18)  SpO2: 98% (10-02-22 @ 01:45) (97% - 100%)    09-30-22 @ 07:01  -  10-01-22 @ 07:00  --------------------------------------------------------  IN: 500 mL / OUT: 0 mL / NET: 500 mL    10-01-22 @ 07:01  -  10-02-22 @ 01:49  --------------------------------------------------------  IN: 1050 mL / OUT: 0 mL / NET: 1050 mL        General: Appears well, NAD  Neuro: AAOx3  CHEST: Clear to auscultation bilaterally  CV: Regular rate and rhythm  Abdomen: soft, nontender, nondistended, no rebound or guarding  Extremities: Grossly symmetric    LABS                        12.3   10.38 )-----------( 350      ( 01 Oct 2022 07:10 )             36.9     10-01    140  |  105  |  3<L>  ----------------------------<  103<H>  4.0   |  24  |  0.76    Ca    9.4      01 Oct 2022 07:10  Phos  3.3     10-01  Mg     1.90     10-01    TPro  7.1  /  Alb  3.4  /  TBili  0.5  /  DBili  x   /  AST  38<H>  /  ALT  77<H>  /  AlkPhos  246<H>  10-01          RADIOLOGY & ADDITIONAL STUDIES Surgery Progress Note    SUBJECTIVE: Pt seen and examined at bedside. Patient comfortable and in no-apparent distress. No nausea, vomiting, diarrhea. Pain is controlled. +Gas/+BM. Tolerating diet.    Vital Signs Last 24 Hrs  T(C): 37.1 (02 Oct 2022 09:19), Max: 37.2 (01 Oct 2022 11:08)  T(F): 98.7 (02 Oct 2022 09:19), Max: 98.9 (01 Oct 2022 11:08)  HR: 84 (02 Oct 2022 09:19) (54 - 84)  BP: 128/78 (02 Oct 2022 09:19) (128/78 - 155/83)  BP(mean): --  RR: 98 (02 Oct 2022 09:19) (17 - 98)  SpO2: 97% (02 Oct 2022 06:27) (97% - 100%)    Parameters below as of 02 Oct 2022 09:19  Patient On (Oxygen Delivery Method): room air    Physical Exam:  General Appearance: Appears well, NAD  Respiratory: No labored breathing  CV: Pulse regularly present  Abdomen: Soft, nontender, perc kameron insertion site c/d.    LABS:                        11.8   8.13  )-----------( 392      ( 02 Oct 2022 05:38 )             34.6     10-02    140  |  104  |  3<L>  ----------------------------<  95  3.9   |  25  |  0.76    Ca    8.9      02 Oct 2022 05:38  Phos  3.9     10-02  Mg     1.90     10-02    TPro  6.3  /  Alb  3.0<L>  /  TBili  0.4  /  DBili  x   /  AST  49<H>  /  ALT  70<H>  /  AlkPhos  233<H>  10-02          INs and OUTs:    10-01-22 @ 07:01  -  10-02-22 @ 07:00  --------------------------------------------------------  IN: 1950 mL / OUT: 30 mL / NET: 1920 mL

## 2022-10-02 NOTE — DISCHARGE NOTE PROVIDER - NSDCFUADDINST_GEN_ALL_CORE_FT
You had a drainage catheter placed by Dr. Halaibeh on 9/30 in Interventional Radiology (IR).   Monitor site for any sign or symptoms of infection (painful red skin, green/ yellow foul smelling discharge from the insertion site, fever, chills, leakage around drain site).   Flush drain with 5 mL daily. If you meet resistance upon flushing, STOP and contact IR.   Empty drainage bag or bulb daily and record output. Once output is <10mL in a 24hr period or if there is a sudden decrease in output please contact IR to schedule  an appointment.  Drain care: -Disconnect tubing (tube attached to bag/ bulb)  from the catheter (catheter going into skin) -Clean catheter with alcohol swab -Twist on the flush syringe to the catheter (be sure to push the air out of syringe) -Flush catheter with 5 mL sterile normal saline (DO NOT pull back on syringe). If you meet resistance upon flushing, STOP and contact IR.  -Disconnect syringe from catheter and reconnect drainage bag or bulb.  Dressing care: -Wash hands thoroughly with water and soap for at least 20 seconds.  -take off old dressing and clean around drain site with gauze soaked with warm water -After cleaning the site, dry and replace dressing with a new gauze and tegaderm.  -Place one piece of gauze underneath the drain and another piece of gauze on top of drain.  -Apply tegaderm (clear dressing) on top. -Change dressing every 3 days or when soiled

## 2022-10-02 NOTE — PROGRESS NOTE ADULT - REASON FOR ADMISSION
Abdominal pain
Abdominal pain = Cholecystitis secondary to cholelithiasis, possible choledocholithiasis
Acute on chronic cholecystitis

## 2022-10-05 LAB
CULTURE RESULTS: SIGNIFICANT CHANGE UP
ORGANISM # SPEC MICROSCOPIC CNT: SIGNIFICANT CHANGE UP
ORGANISM # SPEC MICROSCOPIC CNT: SIGNIFICANT CHANGE UP
SPECIMEN SOURCE: SIGNIFICANT CHANGE UP

## 2022-10-10 ENCOUNTER — APPOINTMENT (OUTPATIENT)
Dept: MRI IMAGING | Facility: CLINIC | Age: 57
End: 2022-10-10

## 2022-10-28 ENCOUNTER — OUTPATIENT (OUTPATIENT)
Dept: OUTPATIENT SERVICES | Facility: HOSPITAL | Age: 57
LOS: 1 days | End: 2022-10-28

## 2022-10-28 ENCOUNTER — TRANSCRIPTION ENCOUNTER (OUTPATIENT)
Age: 57
End: 2022-10-28

## 2022-10-28 VITALS
RESPIRATION RATE: 20 BRPM | SYSTOLIC BLOOD PRESSURE: 125 MMHG | DIASTOLIC BLOOD PRESSURE: 75 MMHG | OXYGEN SATURATION: 100 % | HEART RATE: 65 BPM

## 2022-10-28 VITALS
TEMPERATURE: 98 F | HEART RATE: 68 BPM | OXYGEN SATURATION: 100 % | SYSTOLIC BLOOD PRESSURE: 123 MMHG | DIASTOLIC BLOOD PRESSURE: 80 MMHG | RESPIRATION RATE: 20 BRPM

## 2022-10-28 DIAGNOSIS — K81.0 ACUTE CHOLECYSTITIS: ICD-10-CM

## 2022-10-28 PROBLEM — K44.9 DIAPHRAGMATIC HERNIA WITHOUT OBSTRUCTION OR GANGRENE: Chronic | Status: ACTIVE | Noted: 2022-09-29

## 2022-10-28 PROCEDURE — 47531 INJECTION FOR CHOLANGIOGRAM: CPT

## 2022-10-29 LAB
CULTURE RESULTS: SIGNIFICANT CHANGE UP
SPECIMEN SOURCE: SIGNIFICANT CHANGE UP

## 2022-11-01 DIAGNOSIS — Z46.59 ENCOUNTER FOR FITTING AND ADJUSTMENT OF OTHER GASTROINTESTINAL APPLIANCE AND DEVICE: ICD-10-CM

## 2022-11-01 DIAGNOSIS — K81.0 ACUTE CHOLECYSTITIS: ICD-10-CM

## 2022-11-28 ENCOUNTER — OUTPATIENT (OUTPATIENT)
Dept: OUTPATIENT SERVICES | Facility: HOSPITAL | Age: 57
LOS: 1 days | End: 2022-11-28

## 2022-11-28 DIAGNOSIS — K81.0 ACUTE CHOLECYSTITIS: ICD-10-CM

## 2022-11-28 PROCEDURE — 47531 INJECTION FOR CHOLANGIOGRAM: CPT

## 2022-12-01 ENCOUNTER — OUTPATIENT (OUTPATIENT)
Dept: OUTPATIENT SERVICES | Facility: HOSPITAL | Age: 57
LOS: 1 days | End: 2022-12-01
Payer: COMMERCIAL

## 2022-12-01 ENCOUNTER — APPOINTMENT (OUTPATIENT)
Dept: ULTRASOUND IMAGING | Facility: IMAGING CENTER | Age: 57
End: 2022-12-01

## 2022-12-01 DIAGNOSIS — Z00.8 ENCOUNTER FOR OTHER GENERAL EXAMINATION: ICD-10-CM

## 2022-12-01 PROCEDURE — 76700 US EXAM ABDOM COMPLETE: CPT

## 2022-12-01 PROCEDURE — 76700 US EXAM ABDOM COMPLETE: CPT | Mod: 26

## 2022-12-02 DIAGNOSIS — Z46.59 ENCOUNTER FOR FITTING AND ADJUSTMENT OF OTHER GASTROINTESTINAL APPLIANCE AND DEVICE: ICD-10-CM

## 2022-12-02 DIAGNOSIS — K81.0 ACUTE CHOLECYSTITIS: ICD-10-CM

## 2023-01-04 ENCOUNTER — OUTPATIENT (OUTPATIENT)
Dept: OUTPATIENT SERVICES | Facility: HOSPITAL | Age: 58
LOS: 1 days | End: 2023-01-04
Payer: COMMERCIAL

## 2023-01-04 ENCOUNTER — RESULT REVIEW (OUTPATIENT)
Age: 58
End: 2023-01-04

## 2023-01-04 VITALS
OXYGEN SATURATION: 100 % | HEART RATE: 79 BPM | RESPIRATION RATE: 14 BRPM | DIASTOLIC BLOOD PRESSURE: 81 MMHG | SYSTOLIC BLOOD PRESSURE: 131 MMHG | TEMPERATURE: 98 F

## 2023-01-04 VITALS
TEMPERATURE: 98 F | DIASTOLIC BLOOD PRESSURE: 80 MMHG | HEART RATE: 83 BPM | RESPIRATION RATE: 14 BRPM | SYSTOLIC BLOOD PRESSURE: 129 MMHG | OXYGEN SATURATION: 100 %

## 2023-01-04 PROCEDURE — 47531 INJECTION FOR CHOLANGIOGRAM: CPT

## 2023-01-09 ENCOUNTER — OUTPATIENT (OUTPATIENT)
Dept: OUTPATIENT SERVICES | Facility: HOSPITAL | Age: 58
LOS: 1 days | End: 2023-01-09

## 2023-01-09 ENCOUNTER — TRANSCRIPTION ENCOUNTER (OUTPATIENT)
Age: 58
End: 2023-01-09

## 2023-01-09 VITALS
TEMPERATURE: 97 F | OXYGEN SATURATION: 99 % | RESPIRATION RATE: 16 BRPM | SYSTOLIC BLOOD PRESSURE: 133 MMHG | DIASTOLIC BLOOD PRESSURE: 76 MMHG | HEIGHT: 63 IN | HEART RATE: 69 BPM | WEIGHT: 147.93 LBS

## 2023-01-09 DIAGNOSIS — K81.9 CHOLECYSTITIS, UNSPECIFIED: ICD-10-CM

## 2023-01-09 DIAGNOSIS — T85.518A BREAKDOWN (MECHANICAL) OF OTHER GASTROINTESTINAL PROSTHETIC DEVICES, IMPLANTS AND GRAFTS, INITIAL ENCOUNTER: Chronic | ICD-10-CM

## 2023-01-09 DIAGNOSIS — Z76.89 PERSONS ENCOUNTERING HEALTH SERVICES IN OTHER SPECIFIED CIRCUMSTANCES: Chronic | ICD-10-CM

## 2023-01-09 LAB
ALBUMIN SERPL ELPH-MCNC: 4.4 G/DL — SIGNIFICANT CHANGE UP (ref 3.3–5)
ALP SERPL-CCNC: 91 U/L — SIGNIFICANT CHANGE UP (ref 40–120)
ALT FLD-CCNC: 10 U/L — SIGNIFICANT CHANGE UP (ref 4–33)
ANION GAP SERPL CALC-SCNC: 10 MMOL/L — SIGNIFICANT CHANGE UP (ref 7–14)
AST SERPL-CCNC: 16 U/L — SIGNIFICANT CHANGE UP (ref 4–32)
BILIRUB SERPL-MCNC: 0.4 MG/DL — SIGNIFICANT CHANGE UP (ref 0.2–1.2)
BUN SERPL-MCNC: 10 MG/DL — SIGNIFICANT CHANGE UP (ref 7–23)
CALCIUM SERPL-MCNC: 9.5 MG/DL — SIGNIFICANT CHANGE UP (ref 8.4–10.5)
CHLORIDE SERPL-SCNC: 103 MMOL/L — SIGNIFICANT CHANGE UP (ref 98–107)
CO2 SERPL-SCNC: 29 MMOL/L — SIGNIFICANT CHANGE UP (ref 22–31)
CREAT SERPL-MCNC: 0.95 MG/DL — SIGNIFICANT CHANGE UP (ref 0.5–1.3)
EGFR: 70 ML/MIN/1.73M2 — SIGNIFICANT CHANGE UP
GLUCOSE SERPL-MCNC: 86 MG/DL — SIGNIFICANT CHANGE UP (ref 70–99)
HCT VFR BLD CALC: 42 % — SIGNIFICANT CHANGE UP (ref 34.5–45)
HGB BLD-MCNC: 14.1 G/DL — SIGNIFICANT CHANGE UP (ref 11.5–15.5)
MCHC RBC-ENTMCNC: 28.8 PG — SIGNIFICANT CHANGE UP (ref 27–34)
MCHC RBC-ENTMCNC: 33.6 GM/DL — SIGNIFICANT CHANGE UP (ref 32–36)
MCV RBC AUTO: 85.7 FL — SIGNIFICANT CHANGE UP (ref 80–100)
NRBC # BLD: 0 /100 WBCS — SIGNIFICANT CHANGE UP (ref 0–0)
NRBC # FLD: 0 K/UL — SIGNIFICANT CHANGE UP (ref 0–0)
PLATELET # BLD AUTO: 321 K/UL — SIGNIFICANT CHANGE UP (ref 150–400)
POTASSIUM SERPL-MCNC: 3.7 MMOL/L — SIGNIFICANT CHANGE UP (ref 3.5–5.3)
POTASSIUM SERPL-SCNC: 3.7 MMOL/L — SIGNIFICANT CHANGE UP (ref 3.5–5.3)
PROT SERPL-MCNC: 7.5 G/DL — SIGNIFICANT CHANGE UP (ref 6–8.3)
RBC # BLD: 4.9 M/UL — SIGNIFICANT CHANGE UP (ref 3.8–5.2)
RBC # FLD: 13.4 % — SIGNIFICANT CHANGE UP (ref 10.3–14.5)
SODIUM SERPL-SCNC: 142 MMOL/L — SIGNIFICANT CHANGE UP (ref 135–145)
WBC # BLD: 4.11 K/UL — SIGNIFICANT CHANGE UP (ref 3.8–10.5)
WBC # FLD AUTO: 4.11 K/UL — SIGNIFICANT CHANGE UP (ref 3.8–10.5)

## 2023-01-09 RX ORDER — OMEPRAZOLE 10 MG/1
1 CAPSULE, DELAYED RELEASE ORAL
Qty: 0 | Refills: 0 | DISCHARGE

## 2023-01-09 RX ORDER — SODIUM CHLORIDE 9 MG/ML
1000 INJECTION, SOLUTION INTRAVENOUS
Refills: 0 | Status: DISCONTINUED | OUTPATIENT
Start: 2023-01-10 | End: 2023-01-10

## 2023-01-09 NOTE — H&P PST ADULT - HISTORY OF PRESENT ILLNESS
57 year old female with PMH of GERD presents to Presurgical testing with diagnosis of  cholecystitis  scheduled for laparoscopic cholecystectomy, possible open cholecystectomy

## 2023-01-09 NOTE — H&P PST ADULT - PROBLEM SELECTOR PLAN 1
Patient tentatively scheduled for laparoscopic cholecystectomy, possible open cholecystectomy on 1/10/23.  Pre-op instructions provided. Pt given verbal and written instructions with teach back. Pt verbalized understanding with return demonstration.   Patient obtained Covid test on 1/7/23

## 2023-01-09 NOTE — ASU PATIENT PROFILE, ADULT - NSICDXPASTSURGICALHX_GEN_ALL_CORE_FT
PAST SURGICAL HISTORY:  Cholecystostomy tube dysfunction      Bactrim Pregnancy And Lactation Text: This medication is Pregnancy Category D and is known to cause fetal risk.  It is also excreted in breast milk.

## 2023-01-09 NOTE — ASU PATIENT PROFILE, ADULT - ABUSE SCREEN COMMENT, PROFILE
Orthopedic & Surgical Nursing Communication Tool        Bedside and Verbal shift change report given to SOUTH Almodovar (incoming nurse) by Maricarmen Edwards RN (outgoing nurse) on Jerzy Coylet a 62 y.o. male and born 1955 who arrived at the hospital on 1/16/2020  5:22 AM. Report included the following information SBAR, Kardex and MAR. Significant changes during shift: complaints of leg spasms, anti-spasm med given along with pain meds. Hart removed, passing gas. No complaints of headache while head elevated. Issues for physician to address: none          Pain Controlled          [x] yes          [] no    Bowel Movement          [] yes          [x] no          Code Status: Prior     Admit Diagnosis: Spinal stenosis [M48.00]     Surgery: Procedure(s):  LUMBAR LAMINECTOMY L4-S1     Infections: No current active infections     Allergies: Patient has no known allergies. Current diet: DIET FULL LIQUID           Vital Signs:     Patient Vitals for the past 12 hrs:   Temp Pulse Resp BP SpO2   01/18/20 0309 99.3 °F (37.4 °C) 91 18 (!) 151/91 91 %   01/17/20 2051 99.1 °F (37.3 °C) 78 18 143/88 93 %      Intake & Output:       Intake/Output Summary (Last 24 hours) at 1/18/2020 0817  Last data filed at 1/18/2020 0630  Gross per 24 hour   Intake 120 ml   Output 4550 ml   Net -4430 ml      Laboratory Results:     Recent Results (from the past 12 hour(s))   HEMOGLOBIN    Collection Time: 01/18/20  3:23 AM   Result Value Ref Range    HGB 12.9 12.1 - 17.0 g/dL            Opportunity for questions and clarifications were given to the incoming nurse. Patient's bed locked and is in low position, side rails up x2, door open PRN, call bell within reach of patient and patient not in distress.       Signed by: Maricarmen Edwards RN, BSN, [facility]                       1/18/2020 at 8:17 AM denied

## 2023-01-09 NOTE — H&P PST ADULT - NEGATIVE PSYCHIATRIC SYMPTOMS
Alannah James) Ayden Ga (DO) Dr. Ga Dr. James no suicidal ideation/no depression/no anxiety cardiology

## 2023-01-09 NOTE — H&P PST ADULT - GASTROINTESTINAL
percutaneous kameron drain in place CDI/soft/nontender/nondistended/normal active bowel sounds details…

## 2023-01-10 ENCOUNTER — TRANSCRIPTION ENCOUNTER (OUTPATIENT)
Age: 58
End: 2023-01-10

## 2023-01-10 ENCOUNTER — OUTPATIENT (OUTPATIENT)
Dept: OUTPATIENT SERVICES | Facility: HOSPITAL | Age: 58
LOS: 1 days | Discharge: HOME | End: 2023-01-10
Payer: COMMERCIAL

## 2023-01-10 VITALS
HEIGHT: 63 IN | WEIGHT: 147.93 LBS | SYSTOLIC BLOOD PRESSURE: 135 MMHG | DIASTOLIC BLOOD PRESSURE: 84 MMHG | RESPIRATION RATE: 15 BRPM | OXYGEN SATURATION: 97 % | HEART RATE: 75 BPM | TEMPERATURE: 98 F

## 2023-01-10 VITALS
SYSTOLIC BLOOD PRESSURE: 151 MMHG | HEART RATE: 92 BPM | DIASTOLIC BLOOD PRESSURE: 76 MMHG | TEMPERATURE: 98 F | OXYGEN SATURATION: 95 % | RESPIRATION RATE: 20 BRPM

## 2023-01-10 DIAGNOSIS — K80.00 CALCULUS OF GALLBLADDER WITH ACUTE CHOLECYSTITIS WITHOUT OBSTRUCTION: ICD-10-CM

## 2023-01-10 DIAGNOSIS — T85.518A BREAKDOWN (MECHANICAL) OF OTHER GASTROINTESTINAL PROSTHETIC DEVICES, IMPLANTS AND GRAFTS, INITIAL ENCOUNTER: Chronic | ICD-10-CM

## 2023-01-10 DIAGNOSIS — K81.9 CHOLECYSTITIS, UNSPECIFIED: ICD-10-CM

## 2023-01-10 DIAGNOSIS — Z46.59 ENCOUNTER FOR FITTING AND ADJUSTMENT OF OTHER GASTROINTESTINAL APPLIANCE AND DEVICE: ICD-10-CM

## 2023-01-10 PROCEDURE — 88304 TISSUE EXAM BY PATHOLOGIST: CPT | Mod: 26

## 2023-01-10 DEVICE — CLIP APPLIER COVIDIEN ENDOCLIP II 10MM MED/LG: Type: IMPLANTABLE DEVICE | Status: FUNCTIONAL

## 2023-01-10 RX ORDER — OXYCODONE HYDROCHLORIDE 5 MG/1
2.5 TABLET ORAL EVERY 4 HOURS
Refills: 0 | Status: DISCONTINUED | OUTPATIENT
Start: 2023-01-10 | End: 2023-01-10

## 2023-01-10 RX ORDER — SODIUM CHLORIDE 9 MG/ML
1000 INJECTION, SOLUTION INTRAVENOUS
Refills: 0 | Status: DISCONTINUED | OUTPATIENT
Start: 2023-01-10 | End: 2023-01-24

## 2023-01-10 RX ORDER — ONDANSETRON 8 MG/1
4 TABLET, FILM COATED ORAL ONCE
Refills: 0 | Status: DISCONTINUED | OUTPATIENT
Start: 2023-01-10 | End: 2023-01-24

## 2023-01-10 RX ORDER — HYDROMORPHONE HYDROCHLORIDE 2 MG/ML
0.5 INJECTION INTRAMUSCULAR; INTRAVENOUS; SUBCUTANEOUS
Refills: 0 | Status: DISCONTINUED | OUTPATIENT
Start: 2023-01-10 | End: 2023-01-10

## 2023-01-10 RX ORDER — OXYCODONE HYDROCHLORIDE 5 MG/1
1 TABLET ORAL
Qty: 8 | Refills: 0
Start: 2023-01-10 | End: 2023-01-11

## 2023-01-10 RX ORDER — OXYCODONE HYDROCHLORIDE 5 MG/1
5 TABLET ORAL ONCE
Refills: 0 | Status: DISCONTINUED | OUTPATIENT
Start: 2023-01-10 | End: 2023-01-10

## 2023-01-10 RX ORDER — FENTANYL CITRATE 50 UG/ML
25 INJECTION INTRAVENOUS
Refills: 0 | Status: DISCONTINUED | OUTPATIENT
Start: 2023-01-10 | End: 2023-01-10

## 2023-01-10 RX ORDER — OXYCODONE HYDROCHLORIDE 5 MG/1
5 TABLET ORAL EVERY 4 HOURS
Refills: 0 | Status: DISCONTINUED | OUTPATIENT
Start: 2023-01-10 | End: 2023-01-10

## 2023-01-10 RX ORDER — OXYCODONE HYDROCHLORIDE 5 MG/1
1 TABLET ORAL
Qty: 8 | Refills: 0
Start: 2023-01-10

## 2023-01-10 RX ADMIN — SODIUM CHLORIDE 75 MILLILITER(S): 9 INJECTION, SOLUTION INTRAVENOUS at 17:45

## 2023-01-10 NOTE — ASU DISCHARGE PLAN (ADULT/PEDIATRIC) - NURSING INSTRUCTIONS
DO NOT take any Tylenol (Acetaminophen) or narcotics containing Tylenol until after  9pm_ . You received Tylenol during your operation and it can cause damage to your liver if too much is taken within a 24 hour time period.

## 2023-01-10 NOTE — ASU DISCHARGE PLAN (ADULT/PEDIATRIC) - NS MD DC FALL RISK RISK
For information on Fall & Injury Prevention, visit: [email or web address] OR  [email or web address] OR  https://www.cdc.gov/steadi/patient.html

## 2023-01-10 NOTE — ASU DISCHARGE PLAN (ADULT/PEDIATRIC) - CARE PROVIDER_API CALL
Azucena Martínez ([facility])  Surgery  [address], [address]  [place] [address]  Phone: [phone]  Fax: [phone]  Follow Up Time: 2 weeks

## 2023-01-10 NOTE — ASU DISCHARGE PLAN (ADULT/PEDIATRIC) - FOLLOW UP APPOINTMENTS
may also call Recovery Room (PACU) 24/7 @ [phone]/[facility]

## 2023-01-10 NOTE — BRIEF OPERATIVE NOTE - OPERATION/FINDINGS
Percutaneous cholecystostomy tube removed. Attachments of gallbladder to anterior abdominal wall taken down with electrocautery. Peritoneum over gallbladder incised. Cystic artery and duct identified. Critical view obtained. Cystic artery clipped and cut followed by the cystic duct which was also clipped and cut. Remainder of gallbladder taken off the liver bed with electrocautery. Hemostasis achieved

## 2023-01-23 LAB — SURGICAL PATHOLOGY STUDY: SIGNIFICANT CHANGE UP

## 2023-10-18 NOTE — ASU PATIENT PROFILE, ADULT - PATIENT REPRESENTATIVE NAME
Returned pt call.   Pt states outside pain management provider is leaving and needs new referral to pain management for CRPS.     Pt agreeable to internal referral.     Okay for referral?    girish

## 2024-07-23 NOTE — CONSULT NOTE ADULT - REASON FOR ADMISSION
Abdominal pain
Dispense: 60 each; Refill: 1      Return for as scheduled.    1.  Jonny received counseling on the following healthy behaviors: nutrition, exercise, and medication adherence  2.  Patient given educational materials - see patient instructions  3.  Was a self-tracking handout given in paper form or via [employer]? No  If yes, see orders or list here.  4.  Discussed use, benefit, and side effects of prescribed medications.  Barriers to medication compliance addressed.  All patient questions answered.  Pt voiced understanding.   5.  Reviewed prior labs, imaging, consultation, follow up, and health maintenance  6.  Continue current medications, diet and exercise.  7. Discussed use, benefit, and side effects of prescribed medications.  Barriers to medication compliance addressed.  All her questions were answered.  Pt voiced understanding. Jonny will continue current medications, diet and exercise.      Of the  30  minute duration appointment visit, Evita Mirza CNP spent at least 50% of the face-to-face time in counseling, explanation of diagnosis, planning of further management, and answering all questions.          Signed:  Evita Mirza CNP

## 2025-03-11 NOTE — ED ADULT TRIAGE NOTE - CHIEF COMPLAINT QUOTE
Pt c/o abdominal pain  X 1 week. Pt had call back from  today, CT scan showing gallstones. Phx: GERD
English

## 2025-06-30 NOTE — DISCHARGE NOTE NURSING/CASE MANAGEMENT/SOCIAL WORK - NSDCPETBCESMAN_GEN_ALL_CORE
Left BRIEF voicemail message at patient's phone number on file to call back for FURTHER DETAILS:    \"This call is regarding results for Matthew, and are within normal limits\"     If you are a smoker, it is important for your health to stop smoking. Please be aware that second hand smoke is also harmful.

## (undated) DEVICE — SYR LUER LOK 20CC

## (undated) DEVICE — TUBING SUCTION CONN 6FT STERILE

## (undated) DEVICE — TROCAR COVIDIEN VERSAONE BLUNT TIP HASSAN 12MM

## (undated) DEVICE — DRAPE 3/4 SHEET 52X76"

## (undated) DEVICE — SYR LUER LOK 5CC

## (undated) DEVICE — TROCAR COVIDIEN VERSAONE BLADED FIXATION 11MM STANDARD

## (undated) DEVICE — BAG SPECIMEN RETRIEVAL ENDOBAG 3X6"

## (undated) DEVICE — FOLEY HOLDER STATLOCK 2 WAY ADULT

## (undated) DEVICE — PROTECTOR HEEL / ELBOW

## (undated) DEVICE — TIP METZENBAUM SCISSOR MONOPOLAR ENDOCUT (ORANGE)

## (undated) DEVICE — DISSECTOR ENDOSCOPIC KITTNER SINGLE TIP

## (undated) DEVICE — SUT PROLENE 0 30" CT-2

## (undated) DEVICE — SUCTION YANKAUER NO CONTROL VENT

## (undated) DEVICE — SYR LUER LOK 50CC

## (undated) DEVICE — CANISTER DISPOSABLE THIN WALL 3000CC

## (undated) DEVICE — TROCAR COVIDIEN VERSAPORT BLADELESS OPTICAL 5MM STANDARD

## (undated) DEVICE — BASIN SET SINGLE

## (undated) DEVICE — CATH ANGIO 14G X 3.25"

## (undated) DEVICE — D HELP - CLEARVIEW CLEARIFY SYSTEM

## (undated) DEVICE — SUT MONOCRYL 4-0 27" PS-2 UNDYED

## (undated) DEVICE — SUT CHROMIC 3-0 27" SH

## (undated) DEVICE — BLADE SURGICAL #15 CARBON

## (undated) DEVICE — ELCTR GROUNDING PAD ADULT COVIDIEN

## (undated) DEVICE — ELCTR BOVIE TIP BLADE INSULATED 2.75" EDGE

## (undated) DEVICE — WARMING BLANKET UPPER ADULT

## (undated) DEVICE — POSITIONER STRAP ARMBOARD VELCRO TS-30

## (undated) DEVICE — SOL IRR POUR NS 0.9% 1000ML

## (undated) DEVICE — SOL INJ NS 0.9% 1000ML

## (undated) DEVICE — VENODYNE/SCD SLEEVE CALF MEDIUM

## (undated) DEVICE — TUBING OLYMPUS INSUFFLATION

## (undated) DEVICE — SOL IRR POUR H2O 500ML

## (undated) DEVICE — GLV 7.5 PROTEXIS (CREAM) MICRO